# Patient Record
Sex: FEMALE | Race: OTHER | HISPANIC OR LATINO | URBAN - METROPOLITAN AREA
[De-identification: names, ages, dates, MRNs, and addresses within clinical notes are randomized per-mention and may not be internally consistent; named-entity substitution may affect disease eponyms.]

---

## 2018-02-18 ENCOUNTER — EMERGENCY (EMERGENCY)
Facility: HOSPITAL | Age: 83
LOS: 1 days | Discharge: HOME | End: 2018-02-18
Attending: EMERGENCY MEDICINE
Payer: COMMERCIAL

## 2018-02-18 VITALS
SYSTOLIC BLOOD PRESSURE: 146 MMHG | DIASTOLIC BLOOD PRESSURE: 72 MMHG | WEIGHT: 70.99 LBS | TEMPERATURE: 98 F | RESPIRATION RATE: 18 BRPM | HEART RATE: 84 BPM | HEIGHT: 56 IN | OXYGEN SATURATION: 98 %

## 2018-02-18 VITALS
DIASTOLIC BLOOD PRESSURE: 70 MMHG | OXYGEN SATURATION: 99 % | SYSTOLIC BLOOD PRESSURE: 155 MMHG | RESPIRATION RATE: 20 BRPM | HEART RATE: 84 BPM

## 2018-02-18 PROCEDURE — 73110 X-RAY EXAM OF WRIST: CPT

## 2018-02-18 PROCEDURE — 99284 EMERGENCY DEPT VISIT MOD MDM: CPT | Mod: 25

## 2018-02-18 PROCEDURE — 73130 X-RAY EXAM OF HAND: CPT

## 2018-02-18 PROCEDURE — 73110 X-RAY EXAM OF WRIST: CPT | Mod: 26,RT

## 2018-02-18 PROCEDURE — 73130 X-RAY EXAM OF HAND: CPT | Mod: 26,RT

## 2018-02-18 PROCEDURE — 99284 EMERGENCY DEPT VISIT MOD MDM: CPT

## 2018-02-18 RX ORDER — ACETAMINOPHEN 500 MG
325 TABLET ORAL EVERY 4 HOURS
Qty: 0 | Refills: 0 | Status: DISCONTINUED | OUTPATIENT
Start: 2018-02-18 | End: 2018-02-22

## 2018-02-18 RX ADMIN — Medication 325 MILLIGRAM(S): at 15:16

## 2018-02-18 NOTE — ED PROVIDER NOTE - OBJECTIVE STATEMENT
89 y/o F pt w/ PMHx of RA w/ existing deformities on both hands presents to the ED c/o R hand pain s/p mechanical trip and fall. Denies numbness/tingling, weakness, head injury, LOC or any other complaints. NKDA.

## 2018-02-18 NOTE — ED ADULT NURSE NOTE - OBJECTIVE STATEMENT
pt from home c/o of Rt hand pain s/p trip and fall yesterday pt is alert awake oriented to person place denies any head injury Rt hand swelling with bruising skin dry and intact

## 2018-02-18 NOTE — ED ADULT TRIAGE NOTE - CHIEF COMPLAINT QUOTE
C/O right hand pain and ecchymosis "Yesterday she fell down she was walking and saved herself with the right hand" per daughter

## 2019-11-01 ENCOUNTER — OUTPATIENT (OUTPATIENT)
Dept: OUTPATIENT SERVICES | Facility: HOSPITAL | Age: 84
LOS: 1 days | End: 2019-11-01
Payer: MEDICARE

## 2019-11-01 PROCEDURE — G9001: CPT

## 2019-11-17 ENCOUNTER — INPATIENT (INPATIENT)
Facility: HOSPITAL | Age: 84
LOS: 3 days | Discharge: HOME | DRG: 177 | End: 2019-11-21
Attending: INTERNAL MEDICINE | Admitting: INTERNAL MEDICINE
Payer: COMMERCIAL

## 2019-11-17 VITALS
OXYGEN SATURATION: 98 % | RESPIRATION RATE: 20 BRPM | WEIGHT: 76.94 LBS | SYSTOLIC BLOOD PRESSURE: 203 MMHG | HEART RATE: 78 BPM | HEIGHT: 56 IN | DIASTOLIC BLOOD PRESSURE: 67 MMHG

## 2019-11-17 DIAGNOSIS — I10 ESSENTIAL (PRIMARY) HYPERTENSION: ICD-10-CM

## 2019-11-17 DIAGNOSIS — J18.9 PNEUMONIA, UNSPECIFIED ORGANISM: ICD-10-CM

## 2019-11-17 DIAGNOSIS — F03.90 UNSPECIFIED DEMENTIA WITHOUT BEHAVIORAL DISTURBANCE: ICD-10-CM

## 2019-11-17 DIAGNOSIS — I21.4 NON-ST ELEVATION (NSTEMI) MYOCARDIAL INFARCTION: ICD-10-CM

## 2019-11-17 DIAGNOSIS — R94.5 ABNORMAL RESULTS OF LIVER FUNCTION STUDIES: ICD-10-CM

## 2019-11-17 DIAGNOSIS — Z29.9 ENCOUNTER FOR PROPHYLACTIC MEASURES, UNSPECIFIED: ICD-10-CM

## 2019-11-17 DIAGNOSIS — N17.9 ACUTE KIDNEY FAILURE, UNSPECIFIED: ICD-10-CM

## 2019-11-17 DIAGNOSIS — Z71.89 OTHER SPECIFIED COUNSELING: ICD-10-CM

## 2019-11-17 LAB
ALBUMIN SERPL ELPH-MCNC: 3.8 G/DL — SIGNIFICANT CHANGE UP (ref 3.5–5)
ALP SERPL-CCNC: 217 U/L — HIGH (ref 40–120)
ALT FLD-CCNC: 101 U/L DA — HIGH (ref 10–60)
ANION GAP SERPL CALC-SCNC: 12 MMOL/L — SIGNIFICANT CHANGE UP (ref 5–17)
APPEARANCE UR: CLEAR — SIGNIFICANT CHANGE UP
APTT BLD: 27.4 SEC — LOW (ref 27.5–36.3)
APTT BLD: 52.1 SEC — HIGH (ref 27.5–36.3)
AST SERPL-CCNC: 140 U/L — HIGH (ref 10–40)
BACTERIA # UR AUTO: ABNORMAL /HPF
BASOPHILS # BLD AUTO: 0.08 K/UL — SIGNIFICANT CHANGE UP (ref 0–0.2)
BASOPHILS NFR BLD AUTO: 1 % — SIGNIFICANT CHANGE UP (ref 0–2)
BILIRUB SERPL-MCNC: 2.2 MG/DL — HIGH (ref 0.2–1.2)
BILIRUB UR-MCNC: NEGATIVE — SIGNIFICANT CHANGE UP
BUN SERPL-MCNC: 52 MG/DL — HIGH (ref 7–18)
CALCIUM SERPL-MCNC: 8.9 MG/DL — SIGNIFICANT CHANGE UP (ref 8.4–10.5)
CHLORIDE SERPL-SCNC: 113 MMOL/L — HIGH (ref 96–108)
CK MB BLD-MCNC: 2.8 % — SIGNIFICANT CHANGE UP (ref 0–3.5)
CK MB CFR SERPL CALC: 8.5 NG/ML — HIGH (ref 0–3.6)
CK SERPL-CCNC: 306 U/L — HIGH (ref 21–215)
CO2 SERPL-SCNC: 21 MMOL/L — LOW (ref 22–31)
COLOR SPEC: YELLOW — SIGNIFICANT CHANGE UP
CREAT SERPL-MCNC: 1.84 MG/DL — HIGH (ref 0.5–1.3)
DIFF PNL FLD: ABNORMAL
EOSINOPHIL # BLD AUTO: 0 K/UL — SIGNIFICANT CHANGE UP (ref 0–0.5)
EOSINOPHIL NFR BLD AUTO: 0 % — SIGNIFICANT CHANGE UP (ref 0–6)
EPI CELLS # UR: SIGNIFICANT CHANGE UP /HPF
FLU A RESULT: SIGNIFICANT CHANGE UP
FLU A RESULT: SIGNIFICANT CHANGE UP
FLUAV AG NPH QL: SIGNIFICANT CHANGE UP
FLUBV AG NPH QL: SIGNIFICANT CHANGE UP
GLUCOSE SERPL-MCNC: 113 MG/DL — HIGH (ref 70–99)
GLUCOSE UR QL: NEGATIVE — SIGNIFICANT CHANGE UP
GRAN CASTS # UR COMP ASSIST: ABNORMAL /LPF
HCT VFR BLD CALC: 39.5 % — SIGNIFICANT CHANGE UP (ref 34.5–45)
HCT VFR BLD CALC: 42.3 % — SIGNIFICANT CHANGE UP (ref 34.5–45)
HGB BLD-MCNC: 12.4 G/DL — SIGNIFICANT CHANGE UP (ref 11.5–15.5)
HGB BLD-MCNC: 12.9 G/DL — SIGNIFICANT CHANGE UP (ref 11.5–15.5)
HYALINE CASTS # UR AUTO: ABNORMAL /LPF
INR BLD: 1.28 RATIO — HIGH (ref 0.88–1.16)
KETONES UR-MCNC: ABNORMAL
LACTATE SERPL-SCNC: 2.4 MMOL/L — HIGH (ref 0.7–2)
LACTATE SERPL-SCNC: 2.4 MMOL/L — HIGH (ref 0.7–2)
LACTATE SERPL-SCNC: 2.6 MMOL/L — HIGH (ref 0.7–2)
LEUKOCYTE ESTERASE UR-ACNC: ABNORMAL
LYMPHOCYTES # BLD AUTO: 0.57 K/UL — LOW (ref 1–3.3)
LYMPHOCYTES # BLD AUTO: 7 % — LOW (ref 13–44)
MANUAL SMEAR VERIFICATION: SIGNIFICANT CHANGE UP
MCHC RBC-ENTMCNC: 28.4 PG — SIGNIFICANT CHANGE UP (ref 27–34)
MCHC RBC-ENTMCNC: 28.8 PG — SIGNIFICANT CHANGE UP (ref 27–34)
MCHC RBC-ENTMCNC: 30.5 GM/DL — LOW (ref 32–36)
MCHC RBC-ENTMCNC: 31.4 GM/DL — LOW (ref 32–36)
MCV RBC AUTO: 91.6 FL — SIGNIFICANT CHANGE UP (ref 80–100)
MCV RBC AUTO: 93.2 FL — SIGNIFICANT CHANGE UP (ref 80–100)
MONOCYTES # BLD AUTO: 0.32 K/UL — SIGNIFICANT CHANGE UP (ref 0–0.9)
MONOCYTES NFR BLD AUTO: 4 % — SIGNIFICANT CHANGE UP (ref 2–14)
NEUTROPHILS # BLD AUTO: 7.14 K/UL — SIGNIFICANT CHANGE UP (ref 1.8–7.4)
NEUTROPHILS NFR BLD AUTO: 87 % — HIGH (ref 43–77)
NEUTS BAND # BLD: 1 % — SIGNIFICANT CHANGE UP (ref 0–8)
NEUTS HYPERSEG # BLD: PRESENT — SIGNIFICANT CHANGE UP
NITRITE UR-MCNC: NEGATIVE — SIGNIFICANT CHANGE UP
NRBC # BLD: 0 /100 WBCS — SIGNIFICANT CHANGE UP (ref 0–0)
NRBC # BLD: 0 /100 — SIGNIFICANT CHANGE UP (ref 0–0)
OVALOCYTES BLD QL SMEAR: SLIGHT — SIGNIFICANT CHANGE UP
PH UR: 5 — SIGNIFICANT CHANGE UP (ref 5–8)
PLAT MORPH BLD: NORMAL — SIGNIFICANT CHANGE UP
PLATELET # BLD AUTO: 172 K/UL — SIGNIFICANT CHANGE UP (ref 150–400)
PLATELET # BLD AUTO: 183 K/UL — SIGNIFICANT CHANGE UP (ref 150–400)
POTASSIUM SERPL-MCNC: 4 MMOL/L — SIGNIFICANT CHANGE UP (ref 3.5–5.3)
POTASSIUM SERPL-SCNC: 4 MMOL/L — SIGNIFICANT CHANGE UP (ref 3.5–5.3)
PROT SERPL-MCNC: 7.1 G/DL — SIGNIFICANT CHANGE UP (ref 6–8.3)
PROT UR-MCNC: 500 MG/DL
PROTHROM AB SERPL-ACNC: 14.3 SEC — HIGH (ref 10–12.9)
RBC # BLD: 4.31 M/UL — SIGNIFICANT CHANGE UP (ref 3.8–5.2)
RBC # BLD: 4.54 M/UL — SIGNIFICANT CHANGE UP (ref 3.8–5.2)
RBC # FLD: 14.8 % — HIGH (ref 10.3–14.5)
RBC # FLD: 14.9 % — HIGH (ref 10.3–14.5)
RBC BLD AUTO: ABNORMAL
RBC CASTS # UR COMP ASSIST: SIGNIFICANT CHANGE UP /HPF (ref 0–2)
RSV RESULT: SIGNIFICANT CHANGE UP
RSV RNA RESP QL NAA+PROBE: SIGNIFICANT CHANGE UP
SODIUM SERPL-SCNC: 146 MMOL/L — HIGH (ref 135–145)
SP GR SPEC: 1.02 — SIGNIFICANT CHANGE UP (ref 1.01–1.02)
TROPONIN I SERPL-MCNC: 3.78 NG/ML — HIGH (ref 0–0.04)
TROPONIN I SERPL-MCNC: 4.12 NG/ML — HIGH (ref 0–0.04)
UROBILINOGEN FLD QL: 4
WBC # BLD: 8.11 K/UL — SIGNIFICANT CHANGE UP (ref 3.8–10.5)
WBC # BLD: 8.65 K/UL — SIGNIFICANT CHANGE UP (ref 3.8–10.5)
WBC # FLD AUTO: 8.11 K/UL — SIGNIFICANT CHANGE UP (ref 3.8–10.5)
WBC # FLD AUTO: 8.65 K/UL — SIGNIFICANT CHANGE UP (ref 3.8–10.5)
WBC UR QL: SIGNIFICANT CHANGE UP /HPF (ref 0–5)

## 2019-11-17 PROCEDURE — 99291 CRITICAL CARE FIRST HOUR: CPT

## 2019-11-17 PROCEDURE — 71045 X-RAY EXAM CHEST 1 VIEW: CPT | Mod: 26

## 2019-11-17 RX ORDER — ALBUTEROL 90 UG/1
3 AEROSOL, METERED ORAL
Qty: 0 | Refills: 0 | DISCHARGE

## 2019-11-17 RX ORDER — ASPIRIN/CALCIUM CARB/MAGNESIUM 324 MG
324 TABLET ORAL ONCE
Refills: 0 | Status: COMPLETED | OUTPATIENT
Start: 2019-11-17 | End: 2019-11-17

## 2019-11-17 RX ORDER — CLOTRIMAZOLE 10 MG
1 TROCHE MUCOUS MEMBRANE
Qty: 0 | Refills: 0 | DISCHARGE

## 2019-11-17 RX ORDER — ASPIRIN/CALCIUM CARB/MAGNESIUM 324 MG
1 TABLET ORAL
Qty: 0 | Refills: 0 | DISCHARGE

## 2019-11-17 RX ORDER — LEVOTHYROXINE SODIUM 125 MCG
25 TABLET ORAL DAILY
Refills: 0 | Status: DISCONTINUED | OUTPATIENT
Start: 2019-11-17 | End: 2019-11-18

## 2019-11-17 RX ORDER — ASPIRIN/CALCIUM CARB/MAGNESIUM 324 MG
81 TABLET ORAL DAILY
Refills: 0 | Status: DISCONTINUED | OUTPATIENT
Start: 2019-11-17 | End: 2019-11-20

## 2019-11-17 RX ORDER — AZITHROMYCIN 500 MG/1
500 TABLET, FILM COATED ORAL ONCE
Refills: 0 | Status: COMPLETED | OUTPATIENT
Start: 2019-11-17 | End: 2019-11-17

## 2019-11-17 RX ORDER — INSULIN LISPRO 100/ML
VIAL (ML) SUBCUTANEOUS EVERY 6 HOURS
Refills: 0 | Status: DISCONTINUED | OUTPATIENT
Start: 2019-11-17 | End: 2019-11-19

## 2019-11-17 RX ORDER — SOD,AMMONIUM,POTASSIUM LACTATE
1 CREAM (GRAM) TOPICAL
Qty: 0 | Refills: 0 | DISCHARGE

## 2019-11-17 RX ORDER — PIPERACILLIN AND TAZOBACTAM 4; .5 G/20ML; G/20ML
3.38 INJECTION, POWDER, LYOPHILIZED, FOR SOLUTION INTRAVENOUS ONCE
Refills: 0 | Status: COMPLETED | OUTPATIENT
Start: 2019-11-17 | End: 2019-11-17

## 2019-11-17 RX ORDER — AMLODIPINE BESYLATE 2.5 MG/1
10 TABLET ORAL ONCE
Refills: 0 | Status: COMPLETED | OUTPATIENT
Start: 2019-11-17 | End: 2019-11-17

## 2019-11-17 RX ORDER — MULTIVIT-MIN/FERROUS GLUCONATE 9 MG/15 ML
1 LIQUID (ML) ORAL
Qty: 0 | Refills: 0 | DISCHARGE

## 2019-11-17 RX ORDER — PIPERACILLIN AND TAZOBACTAM 4; .5 G/20ML; G/20ML
3.38 INJECTION, POWDER, LYOPHILIZED, FOR SOLUTION INTRAVENOUS EVERY 12 HOURS
Refills: 0 | Status: DISCONTINUED | OUTPATIENT
Start: 2019-11-17 | End: 2019-11-19

## 2019-11-17 RX ORDER — DONEPEZIL HYDROCHLORIDE 10 MG/1
5 TABLET, FILM COATED ORAL AT BEDTIME
Refills: 0 | Status: DISCONTINUED | OUTPATIENT
Start: 2019-11-17 | End: 2019-11-21

## 2019-11-17 RX ORDER — CEFTRIAXONE 500 MG/1
1000 INJECTION, POWDER, FOR SOLUTION INTRAMUSCULAR; INTRAVENOUS ONCE
Refills: 0 | Status: COMPLETED | OUTPATIENT
Start: 2019-11-17 | End: 2019-11-17

## 2019-11-17 RX ORDER — SODIUM CHLORIDE 9 MG/ML
1000 INJECTION, SOLUTION INTRAVENOUS
Refills: 0 | Status: DISCONTINUED | OUTPATIENT
Start: 2019-11-17 | End: 2019-11-17

## 2019-11-17 RX ORDER — IPRATROPIUM/ALBUTEROL SULFATE 18-103MCG
3 AEROSOL WITH ADAPTER (GRAM) INHALATION EVERY 6 HOURS
Refills: 0 | Status: DISCONTINUED | OUTPATIENT
Start: 2019-11-17 | End: 2019-11-21

## 2019-11-17 RX ORDER — MULTIVIT-MIN/FERROUS GLUCONATE 9 MG/15 ML
1 LIQUID (ML) ORAL DAILY
Refills: 0 | Status: DISCONTINUED | OUTPATIENT
Start: 2019-11-17 | End: 2019-11-21

## 2019-11-17 RX ORDER — CHOLECALCIFEROL (VITAMIN D3) 125 MCG
1 CAPSULE ORAL
Qty: 0 | Refills: 0 | DISCHARGE

## 2019-11-17 RX ORDER — HEPARIN SODIUM 5000 [USP'U]/ML
2700 INJECTION INTRAVENOUS; SUBCUTANEOUS EVERY 6 HOURS
Refills: 0 | Status: DISCONTINUED | OUTPATIENT
Start: 2019-11-17 | End: 2019-11-18

## 2019-11-17 RX ORDER — CHOLECALCIFEROL (VITAMIN D3) 125 MCG
1000 CAPSULE ORAL DAILY
Refills: 0 | Status: DISCONTINUED | OUTPATIENT
Start: 2019-11-17 | End: 2019-11-21

## 2019-11-17 RX ORDER — HEPARIN SODIUM 5000 [USP'U]/ML
2700 INJECTION INTRAVENOUS; SUBCUTANEOUS ONCE
Refills: 0 | Status: COMPLETED | OUTPATIENT
Start: 2019-11-17 | End: 2019-11-17

## 2019-11-17 RX ORDER — DONEPEZIL HYDROCHLORIDE 10 MG/1
1 TABLET, FILM COATED ORAL
Qty: 0 | Refills: 0 | DISCHARGE

## 2019-11-17 RX ORDER — LEVOTHYROXINE SODIUM 125 MCG
1 TABLET ORAL
Qty: 0 | Refills: 0 | DISCHARGE

## 2019-11-17 RX ORDER — SODIUM CHLORIDE 9 MG/ML
1000 INJECTION, SOLUTION INTRAVENOUS ONCE
Refills: 0 | Status: COMPLETED | OUTPATIENT
Start: 2019-11-17 | End: 2019-11-17

## 2019-11-17 RX ORDER — AMLODIPINE BESYLATE 2.5 MG/1
10 TABLET ORAL DAILY
Refills: 0 | Status: DISCONTINUED | OUTPATIENT
Start: 2019-11-17 | End: 2019-11-21

## 2019-11-17 RX ORDER — HEPARIN SODIUM 5000 [USP'U]/ML
INJECTION INTRAVENOUS; SUBCUTANEOUS
Qty: 25000 | Refills: 0 | Status: DISCONTINUED | OUTPATIENT
Start: 2019-11-17 | End: 2019-11-18

## 2019-11-17 RX ADMIN — DONEPEZIL HYDROCHLORIDE 5 MILLIGRAM(S): 10 TABLET, FILM COATED ORAL at 21:17

## 2019-11-17 RX ADMIN — HEPARIN SODIUM 600 UNIT(S)/HR: 5000 INJECTION INTRAVENOUS; SUBCUTANEOUS at 21:15

## 2019-11-17 RX ADMIN — AZITHROMYCIN 255 MILLIGRAM(S): 500 TABLET, FILM COATED ORAL at 14:47

## 2019-11-17 RX ADMIN — HEPARIN SODIUM 2700 UNIT(S): 5000 INJECTION INTRAVENOUS; SUBCUTANEOUS at 14:50

## 2019-11-17 RX ADMIN — CEFTRIAXONE 100 MILLIGRAM(S): 500 INJECTION, POWDER, FOR SOLUTION INTRAMUSCULAR; INTRAVENOUS at 14:47

## 2019-11-17 RX ADMIN — HEPARIN SODIUM 500 UNIT(S)/HR: 5000 INJECTION INTRAVENOUS; SUBCUTANEOUS at 15:01

## 2019-11-17 RX ADMIN — SODIUM CHLORIDE 2000 MILLILITER(S): 9 INJECTION, SOLUTION INTRAVENOUS at 20:02

## 2019-11-17 RX ADMIN — PIPERACILLIN AND TAZOBACTAM 200 GRAM(S): 4; .5 INJECTION, POWDER, LYOPHILIZED, FOR SOLUTION INTRAVENOUS at 17:47

## 2019-11-17 NOTE — ED PROVIDER NOTE - CARE PLAN
Principal Discharge DX:	NSTEMI (non-ST elevated myocardial infarction)  Secondary Diagnosis:	Pneumonia  Secondary Diagnosis:	DAMARI (acute kidney injury)  Secondary Diagnosis:	Hypernatremia

## 2019-11-17 NOTE — H&P ADULT - ASSESSMENT
Patient is a 89 year old female, with PMH of dementia, HTN and HTN, BIBEMS from home for shortness of breath and cough.     CXR showing RLL PNA  Troponins elevated at 4.120 Patient is a 89 year old female, with PMH of dementia, HTN and HTN, BIBEMS from home for shortness of breath and cough.     CXR showing RLL PNA  Troponins elevated at 4.120  Given dose of azithromycin, rocephin and zosyn in ED   Started on heparin drip in ED    Admitted for RLL PNA, NSTEMI, and DAMARI Patient is a 89 year old female, with PMH of dementia, HTN and HTN, BIBEMS from home for shortness of breath and cough.     CXR showing RLL PNA  Troponins elevated at 4.120  Lactate of 2.6 on admission, repeat of 2.4  Given dose of azithromycin, rocephin and zosyn in ED   Started on heparin drip in ED    Admitted for RLL PNA, NSTEMI, and DAMARI

## 2019-11-17 NOTE — H&P ADULT - PROBLEM SELECTOR PLAN 1
-presented with SOB and cough  -as per daughter, pt has chronic cough which got worse this morning with clear sputum  -CXR showing RLL PNA  -afebrile and no leukocytosis  -concern for aspiration PNA  -given dose of zithro, rocephin, and zosyn  -will continue zosyn   -f/u speech and swallow  -NPO for now  -IVF  -follow lactate  -f/u urine and blood cx -presented with SOB and cough  -as per daughter, pt has chronic cough which got worse this morning with clear sputum  -CXR showing RLL PNA  -afebrile and no leukocytosis  -concern for aspiration PNA  -given dose of zithro, rocephin, and zosyn  -will continue zosyn   -f/u speech and swallow  -NPO for now  -IVF  -follow lactate  -f/u urine and blood cx  follow urine legionella and RVP

## 2019-11-17 NOTE — ED PROVIDER NOTE - NS ED ROS FT
Constitutional: No fever or chills. + FATIGUE  Eyes: No pain, blurry vision, or discharge.  ENMT: No hearing changes, pain, discharge or infections. No neck pain or stiffness.  Cardiac: No chest pain, SOB or edema.    Respiratory: + SOB + COUGH    GI: No nausea, vomiting, diarrhea or abdominal pain.  : No dysuria, frequency or burning.  MS: No myalgia, muscle weakness, joint pain or back pain.  Neuro: No headache or weakness. No LOC.  Skin: No skin rash.   Endocrine: No history of thyroid disease or diabetes.  Except as documented in the HPI, all other systems are negative. Constitutional: No fever or chills. + FATIGUE  Eyes: No pain, blurry vision, or discharge.  ENMT: No hearing changes, pain, discharge or infections. No neck pain or stiffness.  Cardiac: No chest pain, SOB or edema.    Respiratory: + SOB + COUGH    GI: No nausea, vomiting, diarrhea or abdominal pain.  : No dysuria, frequency or burning.  MS: No myalgia, muscle weakness, joint pain or back pain.  Neuro: No headache or weakness. No LOC.  Skin: No skin rash.

## 2019-11-17 NOTE — H&P ADULT - PROBLEM SELECTOR PLAN 6
-history of HTN  -continue home meds of amlodipine  -monitor BP and adjust as needed -history of HTN  -continue home meds of amlodipine 10 mg daily   -monitor BP and adjust as needed

## 2019-11-17 NOTE — ED PROVIDER NOTE - ATTENDING CONTRIBUTION TO CARE
Attending Attestation:  Theodore Lima MD,  I have personally performed a history and physical examination of the patient and discussed management with the Student as well as the patient.  I reviewed the Student's note and agree with the documented findings and plan of care.  I have authored and modified critical sections of the Provider Note, including but not limited to HPI, Physical Exam and MDM.

## 2019-11-17 NOTE — ED PROVIDER NOTE - PROGRESS NOTE DETAILS
mitchell: Discussed need to admit with patient & discussed risk and benefits.  Patient agreed to admission.  Discussed case w/ admitting doctor - agreed to admit to their service. discussed  case with mar

## 2019-11-17 NOTE — H&P ADULT - HISTORY OF PRESENT ILLNESS
Patient is a 89 year old female, with PMH of dementia, HTN and HTN, BIBEMS from home for shortness of breath and cough. Patient is a 89 year old female, with PMH of dementia AAO x1 baseline, hypothyroidism, HTN, HLD and has HHA 9 hours Mon-Sat, BIBEMS from home for shortness of breath and cough. As per daughter at bedside, patient has had a chronic cough for some time. This morning, patient seemed to have worsening cough productive of clear sputum. As per daughter, patient also seems to have been coughing whenever she eats food. She normally eats soft foods at home. As per daughter, patient does not seem to have any pain, nausea or vomiting. Unable to get full ROS due to patient's dementia.

## 2019-11-17 NOTE — H&P ADULT - PROBLEM SELECTOR PLAN 7
-as per daughter, pt is DNR/DNI  -daughter is HCP  -does not want any aggressive interventions  -MOLST form in chart

## 2019-11-17 NOTE — H&P ADULT - ATTENDING COMMENTS
aspiration pna - npo, iv abx  NSTEMI - no aggressive intervention. hep gtt, echo, chards f/u  consider palliative eval

## 2019-11-17 NOTE — ED PROVIDER NOTE - OBJECTIVE STATEMENT
Pertinent HPI/PMH/PSH/FHx/SHx and Review of Systems contained within  HPI: 89 yo F bib EMS from home due to SOB. Pt also having productive cough x 2-3 days and generalized fatigue. Pt is demented, but at baseline per daughter, and is unable to provide good Hx. Daughter is Health Care Proxy. Daughter state pt is DNR/DNI and pt does not want stents, Angiography or Bypass Surgery due to pt's age.   PMH/PSH relevant for: HTN, Dementia, HLD  As per daughters ROS negative for: fever, Chest pain, Nausea, vomiting, diarrhea, abdominal pain, dysuria    FamilyHx and SocialHx not otherwise contributory Pertinent HPI/PMH/PSH/FHx/SHx and Review of Systems contained within  HPI: 89 yo F bib EMS from home due to SOB. Pt also having productive cough x 2-3 days and generalized fatigue. Pt is demented, but at baseline per daughter, and is unable to provide good Hx.   of note Daughter is Health Care Proxy. Daughter states pt is DNR/DNI and pt does not want stents, Angiography or Bypass Surgery or any invasive procedures due to pt's age.   PMH/PSH relevant for: HTN, Dementia, HLD  As per daughters ROS negative for: fever, Chest pain, Nausea, vomiting, diarrhea,   FamilyHx and SocialHx not otherwise contributory

## 2019-11-17 NOTE — ED ADULT NURSE NOTE - OBJECTIVE STATEMENT
BIB EMS from home for generalized weakness AMS poor appetite, upon arrival Pt lethargic arose by verbal stimuli on NRM family @bed side. safety maintained

## 2019-11-17 NOTE — ED ADULT NURSE NOTE - ED STAT RN HANDOFF DETAILS
Pt stable at this time, in no distress. Admitted to [facility], on Box B, awaiting bed, endorsed to AILEEN Hill

## 2019-11-17 NOTE — ED PROVIDER NOTE - PHYSICAL EXAMINATION
*GEN: NAD; + CHRONIC ILL APPEARING  *HEAD: NC/AT   *EYES/NOSE: PERRL & EOMI b/l  *THROAT: airway patent, moist mucous membranes  *NECK: Neck supple, no masses  *PULMONARY: + BIBASALAR RALES  *CARDIAC: s1s2, regular rhythm, no Murmur  *ABDOMEN:  ND, NT, soft, no guarding, no rebound, no masses   *BACK: no CVA tenderness, Normal  spine   *EXTREMITIES: symmetric pulses, 2+ dp & radial pulses, capillary refill < 2 seconds, no cyanosis, no edema   *SKIN: no rash or bruising   *NEUROLOGIC: alert, CN 2-12 intact, moves all 4 extremities, full active & passive ROM in all extremities, normal baseline gait *GEN: NAD; + CHRONICILLY ILL APPEARING  *HEAD: NC/AT   *EYES/NOSE: PERRL & EOMI b/l  *THROAT: airway patent, moist mucous membranes  *NECK: Neck supple, no masses  *PULMONARY: + BIBASALAR RALES  *CARDIAC: s1s2, regular rhythm, no Murmur  *ABDOMEN:  ND, NT, soft, no guarding, no rebound, no masses   *BACK: no CVA tenderness, Normal  spine   *EXTREMITIES: symmetric pulses, 2+ dp & radial pulses, capillary refill < 2 seconds, no cyanosis, no edema   *SKIN: no rash or bruising   *NEUROLOGIC: alert, CN 2-12 intact, moves all 4 extremities, full active & passive ROM in all extremities,

## 2019-11-17 NOTE — ED PROVIDER NOTE - CRITICAL CARE PROVIDED
additional history taking/consultation with other physicians/conducted a detailed discussion of DNR status/direct patient care (not related to procedure)/documentation/interpretation of diagnostic studies/consult w/ pt's family directly relating to pts condition

## 2019-11-17 NOTE — ED PROVIDER NOTE - CLINICAL SUMMARY MEDICAL DECISION MAKING FREE TEXT BOX
Pt has a PNA and NSTEMI. Daughter is Health Care Proxy. Daughter state pt it is DNR/DNI and pt does not want stents, Angiography or Bypass Surgery due to pt's age. Daughter agrees to medications. Will admit pt.

## 2019-11-17 NOTE — H&P ADULT - PROBLEM SELECTOR PLAN 8
IMPROVE VTE Individual Risk Assessment  RISK                                                                Points  [  ] Previous VTE                                                  3  [  ] Thrombophilia                                               2  [  ] Lower limb paralysis                                      2        (unable to hold up >15 seconds)    [  ] Current Cancer                                              2         (within 6 months)  [  ] Immobilization > 24 hrs                                1  [  ] ICU/CCU stay > 24 hours                              1  (X) Age > 60                                                      1  IMPROVE VTE Score 1    currently on heparin drip

## 2019-11-17 NOTE — ED ADULT NURSE REASSESSMENT NOTE - NS ED NURSE REASSESS COMMENT FT1
Received pt from AILEEN Banuelos, pt is observed laying in bed, breathing via NC @ 2l/min, tolerating well, in no respiratory distress at time of assessment. Pt is A&O x1-2, not very verbal, murmurs words, not able to make needs known. Skin intact, pt is not ambulatory. Left AC #18Ga and right AC #20Ga in place. Meds administered as ordered. Repeat cardiac enzymes and RVP specimens sent to lab. Pt receiving heparin @ 500units/min, tolerating well. Admitted to [facility], on box B, sinus rhythm noted, HR 63 at this time. Awaiting bed, endorsed to AILEEN Hill for holding. Daughter at bedside.

## 2019-11-17 NOTE — H&P ADULT - PROBLEM SELECTOR PLAN 2
-noted to have elevated troponin of 4.12  -started on heparin drip  -EKG showing RBBB  -cardio Dr. Singh consulted  -ECHO ordered  -tele monitoring  -troponin downtrending and now is 3.780  -f/u cardio for need for heparin drip

## 2019-11-17 NOTE — H&P ADULT - NSHPSOCIALHISTORY_GEN_ALL_CORE
Patient lives at home with daughter. Has HHA from 9am-6pm Monday to Saturday. No history of smoking, alcohol or illicit drug use. Patient uses walker at baseline and uses wheelchair as per daughter.

## 2019-11-18 DIAGNOSIS — Z51.5 ENCOUNTER FOR PALLIATIVE CARE: ICD-10-CM

## 2019-11-18 DIAGNOSIS — R53.2 FUNCTIONAL QUADRIPLEGIA: ICD-10-CM

## 2019-11-18 DIAGNOSIS — E03.9 HYPOTHYROIDISM, UNSPECIFIED: ICD-10-CM

## 2019-11-18 DIAGNOSIS — E43 UNSPECIFIED SEVERE PROTEIN-CALORIE MALNUTRITION: ICD-10-CM

## 2019-11-18 DIAGNOSIS — E87.6 HYPOKALEMIA: ICD-10-CM

## 2019-11-18 DIAGNOSIS — J69.0 PNEUMONITIS DUE TO INHALATION OF FOOD AND VOMIT: ICD-10-CM

## 2019-11-18 DIAGNOSIS — Z02.9 ENCOUNTER FOR ADMINISTRATIVE EXAMINATIONS, UNSPECIFIED: ICD-10-CM

## 2019-11-18 LAB
ALBUMIN SERPL ELPH-MCNC: 3.4 G/DL — LOW (ref 3.5–5)
ALP SERPL-CCNC: 208 U/L — HIGH (ref 40–120)
ALT FLD-CCNC: 86 U/L DA — HIGH (ref 10–60)
ANION GAP SERPL CALC-SCNC: 11 MMOL/L — SIGNIFICANT CHANGE UP (ref 5–17)
APTT BLD: 28.8 SEC — SIGNIFICANT CHANGE UP (ref 27.5–36.3)
APTT BLD: 66.6 SEC — HIGH (ref 27.5–36.3)
APTT BLD: 90.4 SEC — HIGH (ref 27.5–36.3)
AST SERPL-CCNC: 101 U/L — HIGH (ref 10–40)
BASOPHILS # BLD AUTO: 0.02 K/UL — SIGNIFICANT CHANGE UP (ref 0–0.2)
BASOPHILS NFR BLD AUTO: 0.3 % — SIGNIFICANT CHANGE UP (ref 0–2)
BILIRUB SERPL-MCNC: 1.7 MG/DL — HIGH (ref 0.2–1.2)
BUN SERPL-MCNC: 49 MG/DL — HIGH (ref 7–18)
CALCIUM SERPL-MCNC: 8.6 MG/DL — SIGNIFICANT CHANGE UP (ref 8.4–10.5)
CHLORIDE SERPL-SCNC: 113 MMOL/L — HIGH (ref 96–108)
CHOLEST SERPL-MCNC: 175 MG/DL — SIGNIFICANT CHANGE UP (ref 10–199)
CK MB BLD-MCNC: 3 % — SIGNIFICANT CHANGE UP (ref 0–3.5)
CK MB CFR SERPL CALC: 8.2 NG/ML — HIGH (ref 0–3.6)
CK SERPL-CCNC: 273 U/L — HIGH (ref 21–215)
CO2 SERPL-SCNC: 21 MMOL/L — LOW (ref 22–31)
CREAT SERPL-MCNC: 1.65 MG/DL — HIGH (ref 0.5–1.3)
CULTURE RESULTS: SIGNIFICANT CHANGE UP
EOSINOPHIL # BLD AUTO: 0.08 K/UL — SIGNIFICANT CHANGE UP (ref 0–0.5)
EOSINOPHIL NFR BLD AUTO: 1 % — SIGNIFICANT CHANGE UP (ref 0–6)
FOLATE SERPL-MCNC: 11.2 NG/ML — SIGNIFICANT CHANGE UP
GLUCOSE BLDC GLUCOMTR-MCNC: 109 MG/DL — HIGH (ref 70–99)
GLUCOSE BLDC GLUCOMTR-MCNC: 69 MG/DL — LOW (ref 70–99)
GLUCOSE BLDC GLUCOMTR-MCNC: 71 MG/DL — SIGNIFICANT CHANGE UP (ref 70–99)
GLUCOSE BLDC GLUCOMTR-MCNC: 89 MG/DL — SIGNIFICANT CHANGE UP (ref 70–99)
GLUCOSE BLDC GLUCOMTR-MCNC: 98 MG/DL — SIGNIFICANT CHANGE UP (ref 70–99)
GLUCOSE BLDC GLUCOMTR-MCNC: 99 MG/DL — SIGNIFICANT CHANGE UP (ref 70–99)
GLUCOSE SERPL-MCNC: 95 MG/DL — SIGNIFICANT CHANGE UP (ref 70–99)
HBA1C BLD-MCNC: 5.9 % — HIGH (ref 4–5.6)
HCT VFR BLD CALC: 38.3 % — SIGNIFICANT CHANGE UP (ref 34.5–45)
HDLC SERPL-MCNC: 76 MG/DL — SIGNIFICANT CHANGE UP
HGB BLD-MCNC: 11.9 G/DL — SIGNIFICANT CHANGE UP (ref 11.5–15.5)
IMM GRANULOCYTES NFR BLD AUTO: 0.3 % — SIGNIFICANT CHANGE UP (ref 0–1.5)
LACTATE SERPL-SCNC: 1.8 MMOL/L — SIGNIFICANT CHANGE UP (ref 0.7–2)
LIPID PNL WITH DIRECT LDL SERPL: 82 MG/DL — SIGNIFICANT CHANGE UP
LYMPHOCYTES # BLD AUTO: 0.49 K/UL — LOW (ref 1–3.3)
LYMPHOCYTES # BLD AUTO: 6.2 % — LOW (ref 13–44)
MAGNESIUM SERPL-MCNC: 2.2 MG/DL — SIGNIFICANT CHANGE UP (ref 1.6–2.6)
MCHC RBC-ENTMCNC: 28.4 PG — SIGNIFICANT CHANGE UP (ref 27–34)
MCHC RBC-ENTMCNC: 31.1 GM/DL — LOW (ref 32–36)
MCV RBC AUTO: 91.4 FL — SIGNIFICANT CHANGE UP (ref 80–100)
MONOCYTES # BLD AUTO: 0.49 K/UL — SIGNIFICANT CHANGE UP (ref 0–0.9)
MONOCYTES NFR BLD AUTO: 6.2 % — SIGNIFICANT CHANGE UP (ref 2–14)
NEUTROPHILS # BLD AUTO: 6.85 K/UL — SIGNIFICANT CHANGE UP (ref 1.8–7.4)
NEUTROPHILS NFR BLD AUTO: 86 % — HIGH (ref 43–77)
NRBC # BLD: 0 /100 WBCS — SIGNIFICANT CHANGE UP (ref 0–0)
PHOSPHATE SERPL-MCNC: 3.9 MG/DL — SIGNIFICANT CHANGE UP (ref 2.5–4.5)
PLATELET # BLD AUTO: 182 K/UL — SIGNIFICANT CHANGE UP (ref 150–400)
POTASSIUM SERPL-MCNC: 3.3 MMOL/L — LOW (ref 3.5–5.3)
POTASSIUM SERPL-SCNC: 3.3 MMOL/L — LOW (ref 3.5–5.3)
PROT SERPL-MCNC: 6.4 G/DL — SIGNIFICANT CHANGE UP (ref 6–8.3)
RBC # BLD: 4.19 M/UL — SIGNIFICANT CHANGE UP (ref 3.8–5.2)
RBC # FLD: 14.9 % — HIGH (ref 10.3–14.5)
SODIUM SERPL-SCNC: 145 MMOL/L — SIGNIFICANT CHANGE UP (ref 135–145)
SPECIMEN SOURCE: SIGNIFICANT CHANGE UP
TOTAL CHOLESTEROL/HDL RATIO MEASUREMENT: 2.3 RATIO — LOW (ref 3.3–7.1)
TRIGL SERPL-MCNC: 83 MG/DL — SIGNIFICANT CHANGE UP (ref 10–149)
TROPONIN I SERPL-MCNC: 3.29 NG/ML — HIGH (ref 0–0.04)
TSH SERPL-MCNC: 16.4 UU/ML — HIGH (ref 0.34–4.82)
VIT B12 SERPL-MCNC: 765 PG/ML — SIGNIFICANT CHANGE UP (ref 232–1245)
WBC # BLD: 7.95 K/UL — SIGNIFICANT CHANGE UP (ref 3.8–10.5)
WBC # FLD AUTO: 7.95 K/UL — SIGNIFICANT CHANGE UP (ref 3.8–10.5)

## 2019-11-18 PROCEDURE — 99497 ADVNCD CARE PLAN 30 MIN: CPT | Mod: 25

## 2019-11-18 PROCEDURE — 99223 1ST HOSP IP/OBS HIGH 75: CPT

## 2019-11-18 RX ORDER — METOPROLOL TARTRATE 50 MG
12.5 TABLET ORAL
Refills: 0 | Status: DISCONTINUED | OUTPATIENT
Start: 2019-11-18 | End: 2019-11-21

## 2019-11-18 RX ORDER — ATORVASTATIN CALCIUM 80 MG/1
10 TABLET, FILM COATED ORAL AT BEDTIME
Refills: 0 | Status: DISCONTINUED | OUTPATIENT
Start: 2019-11-18 | End: 2019-11-21

## 2019-11-18 RX ORDER — SODIUM CHLORIDE 9 MG/ML
1000 INJECTION, SOLUTION INTRAVENOUS
Refills: 0 | Status: DISCONTINUED | OUTPATIENT
Start: 2019-11-18 | End: 2019-11-20

## 2019-11-18 RX ORDER — LEVOTHYROXINE SODIUM 125 MCG
12.5 TABLET ORAL AT BEDTIME
Refills: 0 | Status: DISCONTINUED | OUTPATIENT
Start: 2019-11-19 | End: 2019-11-20

## 2019-11-18 RX ADMIN — Medication 3 MILLILITER(S): at 22:37

## 2019-11-18 RX ADMIN — HEPARIN SODIUM 500 UNIT(S)/HR: 5000 INJECTION INTRAVENOUS; SUBCUTANEOUS at 04:16

## 2019-11-18 RX ADMIN — Medication 1000 UNIT(S): at 12:57

## 2019-11-18 RX ADMIN — SODIUM CHLORIDE 50 MILLILITER(S): 9 INJECTION, SOLUTION INTRAVENOUS at 17:53

## 2019-11-18 RX ADMIN — HEPARIN SODIUM 0 UNIT(S)/HR: 5000 INJECTION INTRAVENOUS; SUBCUTANEOUS at 03:42

## 2019-11-18 RX ADMIN — Medication 3 MILLILITER(S): at 04:14

## 2019-11-18 RX ADMIN — Medication 3 MILLILITER(S): at 11:33

## 2019-11-18 RX ADMIN — Medication 3 MILLILITER(S): at 17:50

## 2019-11-18 RX ADMIN — Medication 1 TABLET(S): at 12:57

## 2019-11-18 RX ADMIN — Medication 81 MILLIGRAM(S): at 12:57

## 2019-11-18 RX ADMIN — PIPERACILLIN AND TAZOBACTAM 25 GRAM(S): 4; .5 INJECTION, POWDER, LYOPHILIZED, FOR SOLUTION INTRAVENOUS at 17:49

## 2019-11-18 RX ADMIN — AMLODIPINE BESYLATE 10 MILLIGRAM(S): 2.5 TABLET ORAL at 06:32

## 2019-11-18 RX ADMIN — Medication 25 MICROGRAM(S): at 06:32

## 2019-11-18 RX ADMIN — PIPERACILLIN AND TAZOBACTAM 25 GRAM(S): 4; .5 INJECTION, POWDER, LYOPHILIZED, FOR SOLUTION INTRAVENOUS at 06:32

## 2019-11-18 RX ADMIN — HEPARIN SODIUM 500 UNIT(S)/HR: 5000 INJECTION INTRAVENOUS; SUBCUTANEOUS at 11:33

## 2019-11-18 NOTE — SWALLOW BEDSIDE ASSESSMENT ADULT - COMMENTS
Pt seen at bedside with HOB elevated to 45 degrees. Daughter present at bedside. Pt AAOx1 (name only). Pt resistant to exam, and exhibited discomfort during palpation. SLP provided services in Hebrew. Pt received trials of puree, nectar, and thin. Wet and gurgly voice noted following trials. Pt had significant coughing episode and noted to have significant difficulty breathing and wet/gurgly voice.

## 2019-11-18 NOTE — PROGRESS NOTE ADULT - ATTENDING COMMENTS
pt seen and evaluated  asp pna, nstemi  dnr/i  comfort care  dysphagia diet, aware of asp risk  palliative f/u  plan for inpt hospice

## 2019-11-18 NOTE — CONSULT NOTE ADULT - SUBJECTIVE AND OBJECTIVE BOX
HPI:  Patient is a 89 year old female, with PMH of dementia AAO x1 baseline, hypothyroidism, HTN, HLD and has HHA 9 hours Mon-Sat, BIBEMS from home for shortness of breath and cough. As per daughter at bedside, patient has had a chronic cough for some time. This morning, patient seemed to have worsening cough productive of clear sputum. As per daughter, patient also seems to have been coughing whenever she eats food. She normally eats soft foods at home. As per daughter, patient does not seem to have any pain, nausea or vomiting. Unable to get full ROS due to patient's dementia. (2019 17:25)    Interval history: patient seen and examined in ED. Daughter at bedside. DNR/DNI on file .      PAST MEDICAL & SURGICAL HISTORY:  HLD (hyperlipidemia)  Dementia  Hypertension  No significant past surgical history      SOCIAL HISTORY:  has 4 children  Admitted from:  home     Preferred Language: Irish    Surrogate/HCP/Guardian:    Amber Lyon        Phone#: [phone]    FAMILY HISTORY: noncontributory    Baseline ADLs (prior to admission): min ambulatory, min verbal    Allergies    No Known Allergies    Intolerances      Present Symptoms:             Review of Systems:  Unable to obtain due to poor mentation     MEDICATIONS  (STANDING):  albuterol/ipratropium for Nebulization 3 milliLiter(s) Nebulizer every 6 hours  amLODIPine   Tablet 10 milliGRAM(s) Oral daily  aspirin enteric coated 81 milliGRAM(s) Oral daily  cholecalciferol 1000 Unit(s) Oral daily  donepezil 5 milliGRAM(s) Oral at bedtime  insulin lispro (HumaLOG) corrective regimen sliding scale   SubCutaneous every 6 hours  multivitamin/minerals 1 Tablet(s) Oral daily  piperacillin/tazobactam IVPB.. 3.375 Gram(s) IV Intermittent every 12 hours    MEDICATIONS  (PRN):      PHYSICAL EXAM:    Vital Signs Last 24 Hrs  T(C): 36.4 (2019 11:39), Max: 37.3 (2019 05:29)  T(F): 97.5 (2019 11:39), Max: 99.2 (2019 05:29)  HR: 74 (2019 11:39) (61 - 108)  BP: 152/71 (2019 11:39) (147/89 - 185/75)  BP(mean): --  RR: 20 (2019 11:39) (18 - 20)  SpO2: 94% (2019 11:39) (94% - 100%)     Karnofsky Performance Score/Palliative Performance Status Version2:  30  %     GENERAL: alert, cachectic,  NAD  HEENT: Atraumatic, oropharynx clear, neck supple  CHEST/LUNG: unlabored  HEART: Regular rate and rhythm    ABDOMEN: Soft, Nontender, Nondistended   MUSCULOSKELETAL:  No  edema   NERVOUS SYSTEM:   confused, min verbal  SKIN: No rashes or lesions noted  Oral intake: poor       LABS:                        11.9   7.95  )-----------( 182      ( 2019 06:28 )             38.3         145  |  113<H>  |  49<H>  ----------------------------<  95  3.3<L>   |  21<L>  |  1.65<H>    Ca    8.6      2019 06:28  Phos  3.9       Mg     2.2         TPro  6.4  /  Alb  3.4<L>  /  TBili  1.7<H>  /  DBili  x   /  AST  101<H>  /  ALT  86<H>  /  AlkPhos  208<H>      Urinalysis Basic - ( 2019 13:39 )    Color: Yellow / Appearance: Clear / S.025 / pH: x  Gluc: x / Ketone: Trace  / Bili: Negative / Urobili: 4   Blood: x / Protein: 500 mg/dL / Nitrite: Negative   Leuk Esterase: Trace / RBC: 0-2 /HPF / WBC 0-2 /HPF   Sq Epi: x / Non Sq Epi: Few /HPF / Bacteria: Few /HPF        RADIOLOGY & ADDITIONAL STUDIES:    ADVANCE DIRECTIVES: HCP

## 2019-11-18 NOTE — SWALLOW BEDSIDE ASSESSMENT ADULT - SLP PERTINENT HISTORY OF CURRENT PROBLEM
89 y/o F, with PMHx of dementia AAO x1 baseline, hypothyroidism, HTN, HLD and has HHA 9 hours Mon-Sat, BIBEMS from home for shortness of breath and cough. As per daughter at bedside, patient has had a chronic cough for some time. This morning, patient seemed to have worsening cough productive of clear sputum. As per daughter, patient also seems to have been coughing whenever she eats food. She normally eats soft foods at home. As per daughter, patient does not seem to have any pain, nausea or vomiting. Unable to get full ROS due to patient's dementia.

## 2019-11-18 NOTE — SWALLOW BEDSIDE ASSESSMENT ADULT - PHARYNGEAL PHASE
Delayed throat clear post oral intake/Delayed pharyngeal swallow/Decreased laryngeal elevation/Throat clear post oral intake/Wet vocal quality post oral intake/Multiple swallows Delayed throat clear post oral intake/Multiple swallows/Delayed pharyngeal swallow/Decreased laryngeal elevation/Throat clear post oral intake Delayed cough post oral intake/Delayed pharyngeal swallow/Multiple swallows/Decreased laryngeal elevation/Cough post oral intake

## 2019-11-18 NOTE — PROGRESS NOTE ADULT - PROBLEM SELECTOR PLAN 1
Tropinin downtrending  Heparin drip discontinued in setting of pt DNR/DNI, hospice per HCP   c/w ASA  F/u cardiology consult with Dr. Singh Tropinin downtrending  Heparin drip discontinued in setting of pt DNR/DNI, hospice per HCP   c/w ASA,   Will Statin, BB  Cardiology Dr. Singh following

## 2019-11-18 NOTE — CONSULT NOTE ADULT - PROBLEM SELECTOR RECOMMENDATION 9
advanced, min ambulatory, min verbal, declining over past several mo per daughter. FAST 7c, hospice eligible. Daughter reports patient awakes freq at night and tries to get out of bed. Currently calm

## 2019-11-18 NOTE — CONSULT NOTE ADULT - PROBLEM SELECTOR RECOMMENDATION 3
2/2 advanced dementia. Daughter reports patient often chokes with food, water intake. SLP eval. Does not want feeding tubes. Comfort feeds as tolerated. nutrition eval.

## 2019-11-18 NOTE — SWALLOW BEDSIDE ASSESSMENT ADULT - SWALLOW EVAL: DIAGNOSIS
Pt p/w oropharyngeal dysphagia c/b anterior loss of bolus, minimal bolus formation, suspected premature bolus escape, limited laryngeal elevation, delayed swallow trigger, and multiple swallows. Overt s/s of aspiration observed on all trials; throat clearing on puree and nectar and extensive coughing on thin liquids.

## 2019-11-18 NOTE — SWALLOW BEDSIDE ASSESSMENT ADULT - ORAL PHASE
Delayed oral transit time/Decreased anterior-posterior movement of the bolus Decreased anterior-posterior movement of the bolus/Delayed oral transit time Decreased anterior-posterior movement of the bolus/Delayed oral transit time/Stasis in anterior sulcus

## 2019-11-18 NOTE — CONSULT NOTE ADULT - PROBLEM SELECTOR RECOMMENDATION 6
Met with patient's daughter/HCP Amber at bedside regarding current condition, trajectory of advanced illness, goals of care. She stated she will bring in HCP form. She expressed that the patient has been declining over the past several months. Their main goal is for the patient to be comfortable, they do not want any aggressive measures. Hospice philosophy discussed, she is agreeable for hospice services at home. MATTHEW drafted for DNR/DNI, DNH, no feeding tubes per daughter's wishes. SUpport provided.     Total advance care planning discussion time: 25 min Met with patient's daughter/HCP Amber at bedside regarding current condition, trajectory of advanced illness, goals of care. She stated she will bring in HCP form. She expressed that the patient has been declining over the past several months. Their main goal is for the patient to be comfortable, they do not want any aggressive measures. Hospice philosophy discussed, she is agreeable for hospice services at home once medically stable. MATTHEW drafted for DNR/DNI, DNH, no feeding tubes per daughter's wishes. SUpport provided.     Total advance care planning discussion time: 25 min

## 2019-11-18 NOTE — SWALLOW BEDSIDE ASSESSMENT ADULT - ORAL PREPARATORY PHASE
Refuses to accept bolus into oral cavity/Reduced oral grading/Bolus falls into anterior sulcus/Anterior loss of bolus/Decreased mastication ability Anterior loss of bolus/Bolus falls into anterior sulcus Bolus falls into anterior sulcus/Anterior loss of bolus

## 2019-11-18 NOTE — CONSULT NOTE ADULT - SUBJECTIVE AND OBJECTIVE BOX
CHIEF COMPLAINT:Patient is a 89y old  Female who presents with a chief complaint of shortness of breath and cough.      HPI:  Patient is a 89 year old female, with PMH of dementia AAO x1 baseline, hypothyroidism, HTN, HLD and has HHA 9 hours Mon-Sat, BIBEMS from home for shortness of breath and cough. As per daughter at bedside, patient has had a chronic cough for some time. This morning, patient seemed to have worsening cough productive of clear sputum. As per daughter, patient also seems to have been coughing whenever she eats food. She normally eats soft foods at home. As per daughter, patient does not seem to have any pain, nausea or vomiting. Unable to get full ROS due to patient's dementia. (17 Nov 2019 17:25)      PAST MEDICAL & SURGICAL HISTORY:  HLD (hyperlipidemia)  Dementia  Hypertension        MEDICATIONS  (STANDING):  albuterol/ipratropium for Nebulization 3 milliLiter(s) Nebulizer every 6 hours  amLODIPine   Tablet 10 milliGRAM(s) Oral daily  aspirin enteric coated 81 milliGRAM(s) Oral daily  cholecalciferol 1000 Unit(s) Oral daily  donepezil 5 milliGRAM(s) Oral at bedtime  heparin  Infusion.  Unit(s)/Hr (5 mL/Hr) IV Continuous <Continuous>  insulin lispro (HumaLOG) corrective regimen sliding scale   SubCutaneous every 6 hours  levothyroxine 25 MICROGram(s) Oral daily  multivitamin/minerals 1 Tablet(s) Oral daily  piperacillin/tazobactam IVPB.. 3.375 Gram(s) IV Intermittent every 12 hours    MEDICATIONS  (PRN):  heparin  Injectable 2700 Unit(s) IV Push every 6 hours PRN For aPTT less than 40      FAMILY HISTORY:No hx of CAD      SOCIAL HISTORY:    [x ] Non-smoker    [x ] Alcohol-denies    Allergies    No Known Allergies    Intolerances    	    REVIEW OF SYSTEMS:    [x ] Unable to obtain    PHYSICAL EXAM:  T(C): 36.4 (11-18-19 @ 11:39), Max: 37.3 (11-18-19 @ 05:29)  HR: 74 (11-18-19 @ 11:39) (61 - 108)  BP: 152/71 (11-18-19 @ 11:39) (147/89 - 185/75)  RR: 20 (11-18-19 @ 11:39) (18 - 20)  SpO2: 94% (11-18-19 @ 11:39) (94% - 100%)  Wt(kg): --  I&O's Summary      Appearance: Normal	  HEENT:   Normal oral mucosa, PERRL, EOMI	  Lymphatic: No lymphadenopathy  Cardiovascular: Normal S1 S2, No JVD, No murmurs, No edema  Respiratory: B/L ronchi  Gastrointestinal:  Soft, Non-tender, + BS	  Skin: No rashes, No ecchymoses, No cyanosis	  Extremities: Normal range of motion, No clubbing, cyanosis or edema  Vascular: Peripheral pulses palpable 2+ bilaterally    	  LABS:	 	  CARDIAC MARKERS ( 18 Nov 2019 06:28 )  3.290 ng/mL / x     / 273 U/L / x     / 8.2 ng/mL  CARDIAC MARKERS ( 17 Nov 2019 17:56 )  3.780 ng/mL / x     / 306 U/L / x     / 8.5 ng/mL  CARDIAC MARKERS ( 17 Nov 2019 13:39 )  4.120 ng/mL / x     / x     / x     / x                                  11.9   7.95  )-----------( 182      ( 18 Nov 2019 06:28 )             38.3     11-18    145  |  113<H>  |  49<H>  ----------------------------<  95  3.3<L>   |  21<L>  |  1.65<H>    Ca    8.6      18 Nov 2019 06:28  Phos  3.9     11-18  Mg     2.2     11-18    TPro  6.4  /  Alb  3.4<L>  /  TBili  1.7<H>  /  DBili  x   /  AST  101<H>  /  ALT  86<H>  /  AlkPhos  208<H>  11-18      Lipid Profile: Cholesterol 175  LDL 82  HDL 76  TG 83    HgA1c: Hemoglobin A1C, Whole Blood: 5.9 % (11-18 @ 10:58)    TSH: Thyroid Stimulating Hormone, Serum: 16.40 uU/mL (11-18 @ 06:28)        EXAM:  XR CHEST AP OR PA 1V                            PROCEDURE DATE:  11/17/2019          INTERPRETATION:  Difficulty breathing.    AP chest.    Chin obscures portions both apices.  Heart enlarged despite AP film. Atherosclerotic aorta. Focal   consolidation right lower lobe presumed pneumonia in appropriate clinical   setting. Please image to resolution. Streaky fibrotic atelectatic change   right perihilar region. Trace bilateral pleural effusions. Thoracic   dextroscoliosis. Old left clavicle fracture.    Impression: Cardiomegaly. Right lower lobe pneumonia. Trace pleural   effusions.    PT/INR - ( 17 Nov 2019 13:39 )   PT: 14.3 sec;   INR: 1.28 ratio         PTT - ( 18 Nov 2019 10:54 )  PTT:66.6 sec

## 2019-11-19 DIAGNOSIS — Z71.89 OTHER SPECIFIED COUNSELING: ICD-10-CM

## 2019-11-19 LAB
GLUCOSE BLDC GLUCOMTR-MCNC: 127 MG/DL — HIGH (ref 70–99)
GLUCOSE BLDC GLUCOMTR-MCNC: 75 MG/DL — SIGNIFICANT CHANGE UP (ref 70–99)
GLUCOSE BLDC GLUCOMTR-MCNC: 85 MG/DL — SIGNIFICANT CHANGE UP (ref 70–99)
GLUCOSE BLDC GLUCOMTR-MCNC: 86 MG/DL — SIGNIFICANT CHANGE UP (ref 70–99)
GLUCOSE BLDC GLUCOMTR-MCNC: 90 MG/DL — SIGNIFICANT CHANGE UP (ref 70–99)
HCT VFR BLD CALC: 38.9 % — SIGNIFICANT CHANGE UP (ref 34.5–45)
HGB BLD-MCNC: 12 G/DL — SIGNIFICANT CHANGE UP (ref 11.5–15.5)
MCHC RBC-ENTMCNC: 27.8 PG — SIGNIFICANT CHANGE UP (ref 27–34)
MCHC RBC-ENTMCNC: 30.8 GM/DL — LOW (ref 32–36)
MCV RBC AUTO: 90.3 FL — SIGNIFICANT CHANGE UP (ref 80–100)
NRBC # BLD: 0 /100 WBCS — SIGNIFICANT CHANGE UP (ref 0–0)
PLATELET # BLD AUTO: 178 K/UL — SIGNIFICANT CHANGE UP (ref 150–400)
RBC # BLD: 4.31 M/UL — SIGNIFICANT CHANGE UP (ref 3.8–5.2)
RBC # FLD: 14.7 % — HIGH (ref 10.3–14.5)
WBC # BLD: 7.51 K/UL — SIGNIFICANT CHANGE UP (ref 3.8–10.5)
WBC # FLD AUTO: 7.51 K/UL — SIGNIFICANT CHANGE UP (ref 3.8–10.5)

## 2019-11-19 RX ORDER — LANOLIN ALCOHOL/MO/W.PET/CERES
3 CREAM (GRAM) TOPICAL AT BEDTIME
Refills: 0 | Status: DISCONTINUED | OUTPATIENT
Start: 2019-11-19 | End: 2019-11-21

## 2019-11-19 RX ORDER — LANOLIN ALCOHOL/MO/W.PET/CERES
3 CREAM (GRAM) TOPICAL ONCE
Refills: 0 | Status: COMPLETED | OUTPATIENT
Start: 2019-11-19 | End: 2019-11-19

## 2019-11-19 RX ORDER — INSULIN LISPRO 100/ML
VIAL (ML) SUBCUTANEOUS
Refills: 0 | Status: DISCONTINUED | OUTPATIENT
Start: 2019-11-19 | End: 2019-11-21

## 2019-11-19 RX ORDER — PIPERACILLIN AND TAZOBACTAM 4; .5 G/20ML; G/20ML
3.38 INJECTION, POWDER, LYOPHILIZED, FOR SOLUTION INTRAVENOUS ONCE
Refills: 0 | Status: COMPLETED | OUTPATIENT
Start: 2019-11-19 | End: 2019-11-19

## 2019-11-19 RX ADMIN — Medication 1000 UNIT(S): at 18:09

## 2019-11-19 RX ADMIN — Medication 3 MILLILITER(S): at 22:06

## 2019-11-19 RX ADMIN — PIPERACILLIN AND TAZOBACTAM 200 GRAM(S): 4; .5 INJECTION, POWDER, LYOPHILIZED, FOR SOLUTION INTRAVENOUS at 18:56

## 2019-11-19 RX ADMIN — Medication 1 TABLET(S): at 18:09

## 2019-11-19 RX ADMIN — DONEPEZIL HYDROCHLORIDE 5 MILLIGRAM(S): 10 TABLET, FILM COATED ORAL at 22:58

## 2019-11-19 RX ADMIN — Medication 3 MILLILITER(S): at 09:22

## 2019-11-19 RX ADMIN — AMLODIPINE BESYLATE 10 MILLIGRAM(S): 2.5 TABLET ORAL at 06:16

## 2019-11-19 RX ADMIN — Medication 3 MILLILITER(S): at 03:42

## 2019-11-19 RX ADMIN — Medication 1 TABLET(S): at 20:03

## 2019-11-19 RX ADMIN — Medication 12.5 MILLIGRAM(S): at 06:17

## 2019-11-19 RX ADMIN — Medication 12.5 MICROGRAM(S): at 22:57

## 2019-11-19 RX ADMIN — Medication 12.5 MILLIGRAM(S): at 18:04

## 2019-11-19 RX ADMIN — PIPERACILLIN AND TAZOBACTAM 25 GRAM(S): 4; .5 INJECTION, POWDER, LYOPHILIZED, FOR SOLUTION INTRAVENOUS at 06:17

## 2019-11-19 RX ADMIN — Medication 3 MILLILITER(S): at 14:40

## 2019-11-19 RX ADMIN — ATORVASTATIN CALCIUM 10 MILLIGRAM(S): 80 TABLET, FILM COATED ORAL at 22:58

## 2019-11-19 NOTE — PROGRESS NOTE ADULT - SUBJECTIVE AND OBJECTIVE BOX
PGY1 Note discussed with supervising resident and primary attending.    Patient is a 89y old  Female who presents with a chief complaint of shortness of breath and cough (19 Nov 2019 12:11)      INTERVAL HPI/OVERNIGHT EVENTS:  -No major event overnight.    MEDICATIONS  (STANDING):  albuterol/ipratropium for Nebulization 3 milliLiter(s) Nebulizer every 6 hours  amLODIPine   Tablet 10 milliGRAM(s) Oral daily  aspirin enteric coated 81 milliGRAM(s) Oral daily  atorvastatin 10 milliGRAM(s) Oral at bedtime  cholecalciferol 1000 Unit(s) Oral daily  dextrose 5% + sodium chloride 0.45%. 1000 milliLiter(s) (50 mL/Hr) IV Continuous <Continuous>  donepezil 5 milliGRAM(s) Oral at bedtime  insulin lispro (HumaLOG) corrective regimen sliding scale   SubCutaneous Before meals and at bedtime  levothyroxine Injectable 12.5 MICROGram(s) IV Push at bedtime  metoprolol tartrate 12.5 milliGRAM(s) Oral two times a day  multivitamin/minerals 1 Tablet(s) Oral daily  piperacillin/tazobactam IVPB.. 3.375 Gram(s) IV Intermittent every 12 hours    MEDICATIONS  (PRN):      Allergies    No Known Allergies    Intolerances        REVIEW OF SYSTEMS:  CONSTITUTIONAL: No fever,thin  RESPIRATORY: (+) cough, no wheezing, chills or hemoptysis; No shortness of breath  CARDIOVASCULAR: No chest pain, palpitations, dizziness, or leg swelling  GASTROINTESTINAL: No abdominal or epigastric pain. No nausea, vomiting, or hematemesis; No diarrhea or constipation. No melena or hematochezia.  NEUROLOGICAL: No headaches,  or tremors  SKIN: No itching, burning, rashes, or lesions     Vital Signs Last 24 Hrs  T(C): 36.8 (19 Nov 2019 15:01), Max: 36.8 (19 Nov 2019 15:01)  T(F): 98.3 (19 Nov 2019 15:01), Max: 98.3 (19 Nov 2019 15:01)  HR: 96 (19 Nov 2019 15:01) (67 - 96)  BP: 172/81 (19 Nov 2019 15:01) (147/63 - 177/65)  BP(mean): --  RR: 18 (19 Nov 2019 15:01) (16 - 18)  SpO2: 96% (19 Nov 2019 15:01) (90% - 97%)    PHYSICAL EXAM:  GENERAL: NAD, well-groomed,   HEAD:  Atraumatic, Normocephalic  EYES: conjunctiva and sclera clear  NECK: Supple, No JVD, Normal thyroid  CHEST/LUNG: Clear to percussion bilaterally; Couldn't listen carefully as pt was not cooperative  HEART: Regular rate and rhythm; No murmurs, rubs, or gallops  ABDOMEN: Soft, Nontender, Nondistended; Bowel sounds present  NERVOUS SYSTEM:  Alert & Oriented X1,   EXTREMITIES:  2+ Peripheral Pulses, No clubbing, cyanosis, or edema  SKIN;    LABS:                        12.0   7.51  )-----------( 178      ( 19 Nov 2019 07:35 )             38.9     11-18    145  |  113<H>  |  49<H>  ----------------------------<  95  3.3<L>   |  21<L>  |  1.65<H>    Ca    8.6      18 Nov 2019 06:28  Phos  3.9     11-18  Mg     2.2     11-18    TPro  6.4  /  Alb  3.4<L>  /  TBili  1.7<H>  /  DBili  x   /  AST  101<H>  /  ALT  86<H>  /  AlkPhos  208<H>  11-18    PTT - ( 18 Nov 2019 18:26 )  PTT:28.8 sec    CAPILLARY BLOOD GLUCOSE      POCT Blood Glucose.: 90 mg/dL (19 Nov 2019 17:04)  POCT Blood Glucose.: 85 mg/dL (19 Nov 2019 12:08)  POCT Blood Glucose.: 75 mg/dL (19 Nov 2019 05:49)  POCT Blood Glucose.: 86 mg/dL (19 Nov 2019 00:23)  POCT Blood Glucose.: 71 mg/dL (18 Nov 2019 18:44)      RADIOLOGY & ADDITIONAL TESTS:    Imaging Personally Reviewed:  [ ] YES  [ ] NO    Consultant(s) Notes Reviewed:  [ ] YES  [ ] NO

## 2019-11-19 NOTE — PROGRESS NOTE ADULT - ASSESSMENT
89 year old female, with PMH of dementia AAO x1 baseline, hypothyroidism, HTN, HLD and has HHA 9 hours Mon-Sat, BIBEMS from home for shortness of breath and cough,RLL pneumonia,NSTEMI due to demand ischemia.  1.D/W daughter at bedside wants no aggressive measures.  2.ABX.  3.Cont asa, blocker,statin.  4.Hypothyropidism-synthroid.  5.Palliative f/u.  6.PPI.

## 2019-11-19 NOTE — PROGRESS NOTE ADULT - PROBLEM SELECTOR PLAN 1
Tropinin downtrending  Heparin drip discontinued in setting of pt DNR/DNI, hospice per HCP   c/w ASA, Statin, BB  Cardiology Dr. Singh following

## 2019-11-19 NOTE — DIETITIAN INITIAL EVALUATION ADULT. - NS FNS WEIGHT USED FOR CALC
Ht=4' 8"   IBW=88 lb   Admission wt=76 lb    BMI=17.3   Current wt=84.4 lb 11/19/19    ? changes, may due to scale variance e/current

## 2019-11-19 NOTE — DIETITIAN INITIAL EVALUATION ADULT. - FACTORS AFF FOOD INTAKE
difficulty swallowing/change in mental status/difficulty with food procurement/preparation/Yazidi/ethnic/cultural/personal food preferences/acute on chronic comorbidities, advanced age/difficulty chewing/other (specify)/difficulty feeding self

## 2019-11-19 NOTE — DIETITIAN INITIAL EVALUATION ADULT. - PHYSICAL APPEARANCE
debilitated/emaciated/other (specify)/cachectic per MD Skin intact  Nutrition Focused Physical Exam not feasible at present, severe muscle wasting + subcutaneous fat loss observed

## 2019-11-19 NOTE — CONSULT NOTE ADULT - ASSESSMENT
89 year old female, with PMH of dementia AAO x1 baseline, hypothyroidism, HTN, HLD and has HHA 9 hours Mon-Sat, BIBEMS from home for shortness of breath and cough,RLL pneumonia,NSTEMI due to demand ischemia.  1.D/W daughter at bedside wants no aggressive measures.  2.ABX.  3.D/C heparin drip.  4.Cont asa, blocker,statin.  5.Hypothyropidism-synthroid.  6.Palliative eval.  7.PPI.
Fall, initial encounter
Right lower lobe Pneumonia - possibly aspiration    Plan - increase Augmentin to 500mgs po BID x 5 days  DC planning   Reconsult prn.

## 2019-11-19 NOTE — PROGRESS NOTE ADULT - SUBJECTIVE AND OBJECTIVE BOX
CHIEF COMPLAINT:Patient is a 89y old  Female who presents with a chief complaint of shortness of breath and cough.Pt appears comfortable.    	  REVIEW OF SYSTEMS:  [ x] Unable to obtain    PHYSICAL EXAM:  T(C): 36.6 (11-19-19 @ 10:15), Max: 36.6 (11-19-19 @ 05:00)  HR: 67 (11-19-19 @ 10:15) (67 - 81)  BP: 147/63 (11-19-19 @ 10:15) (147/63 - 177/65)  RR: 18 (11-19-19 @ 10:15) (16 - 20)  SpO2: 96% (11-19-19 @ 10:15) (90% - 98%)      Appearance: Normal	  HEENT:   Normal oral mucosa, PERRL, EOMI	  Lymphatic: No lymphadenopathy  Cardiovascular: Normal S1 S2, No JVD, No murmurs, No edema  Respiratory: B/L ronchi  Gastrointestinal:  Soft, Non-tender, + BS	  Skin: No rashes, No ecchymoses, No cyanosis	  Extremities: Normal range of motion, No clubbing, cyanosis or edema  Vascular: Peripheral pulses palpable 2+ bilaterally    MEDICATIONS  (STANDING):  albuterol/ipratropium for Nebulization 3 milliLiter(s) Nebulizer every 6 hours  amLODIPine   Tablet 10 milliGRAM(s) Oral daily  aspirin enteric coated 81 milliGRAM(s) Oral daily  atorvastatin 10 milliGRAM(s) Oral at bedtime  cholecalciferol 1000 Unit(s) Oral daily  dextrose 5% + sodium chloride 0.45%. 1000 milliLiter(s) (50 mL/Hr) IV Continuous <Continuous>  donepezil 5 milliGRAM(s) Oral at bedtime  insulin lispro (HumaLOG) corrective regimen sliding scale   SubCutaneous Before meals and at bedtime  levothyroxine Injectable 12.5 MICROGram(s) IV Push at bedtime  metoprolol tartrate 12.5 milliGRAM(s) Oral two times a day  multivitamin/minerals 1 Tablet(s) Oral daily  piperacillin/tazobactam IVPB.. 3.375 Gram(s) IV Intermittent every 12 hours      	  LABS:	 	      CARDIAC MARKERS ( 18 Nov 2019 06:28 )  3.290 ng/mL / x     / 273 U/L / x     / 8.2 ng/mL  CARDIAC MARKERS ( 17 Nov 2019 17:56 )  3.780 ng/mL / x     / 306 U/L / x     / 8.5 ng/mL  CARDIAC MARKERS ( 17 Nov 2019 13:39 )  4.120 ng/mL / x     / x     / x     / x                                    12.0   7.51  )-----------( 178      ( 19 Nov 2019 07:35 )             38.9     11-18    145  |  113<H>  |  49<H>  ----------------------------<  95  3.3<L>   |  21<L>  |  1.65<H>    Ca    8.6      18 Nov 2019 06:28  Phos  3.9     11-18  Mg     2.2     11-18    TPro  6.4  /  Alb  3.4<L>  /  TBili  1.7<H>  /  DBili  x   /  AST  101<H>  /  ALT  86<H>  /  AlkPhos  208<H>  11-18    proBNP:   Lipid Profile: Cholesterol 175  LDL 82  HDL 76  TG 83    HgA1c: Hemoglobin A1C, Whole Blood: 5.9 % (11-18 @ 10:58)    TSH: Thyroid Stimulating Hormone, Serum: 16.40 uU/mL (11-18 @ 06:28)

## 2019-11-19 NOTE — DIETITIAN INITIAL EVALUATION ADULT. - OTHER INFO
Pt alert, confused, lives home with family, HHA help PTA, spoke with daughter at bedside; poor appetite x long time, worsening x 5d, wt loss reported ( details unclear), often choking with food, water intake, denied GI distress at present; s/p swallow evaluation with NPO recommended 11/18/19, s/p Palliative consult, Severe Malnutrition, comfort feed, no tuber feeding per HCP according to MD, po diet started, observed breakfast <25% intake

## 2019-11-19 NOTE — DIETITIAN INITIAL EVALUATION ADULT. - PERTINENT MEDS FT
MEDICATIONS  (STANDING):  albuterol/ipratropium for Nebulization 3 milliLiter(s) Nebulizer every 6 hours  amLODIPine   Tablet 10 milliGRAM(s) Oral daily  aspirin enteric coated 81 milliGRAM(s) Oral daily  atorvastatin 10 milliGRAM(s) Oral at bedtime  cholecalciferol 1000 Unit(s) Oral daily  dextrose 5% + sodium chloride 0.45%. 1000 milliLiter(s) (50 mL/Hr) IV Continuous <Continuous>  donepezil 5 milliGRAM(s) Oral at bedtime  insulin lispro (HumaLOG) corrective regimen sliding scale   SubCutaneous every 6 hours  levothyroxine Injectable 12.5 MICROGram(s) IV Push at bedtime  metoprolol tartrate 12.5 milliGRAM(s) Oral two times a day  multivitamin/minerals 1 Tablet(s) Oral daily  piperacillin/tazobactam IVPB.. 3.375 Gram(s) IV Intermittent every 12 hours

## 2019-11-19 NOTE — CONSULT NOTE ADULT - SUBJECTIVE AND OBJECTIVE BOX
HPI:  Patient is a 89 year old female, with PMH of dementia AAO x1 baseline, hypothyroidism, HTN, HLD and has HHA 9 hours Mon-Sat, BIBEMS from home for shortness of breath and cough. As per daughter at bedside, patient has had a chronic cough for some time. This morning, patient seemed to have worsening cough productive of clear sputum. As per daughter, patient also seems to have been coughing whenever she eats food. She normally eats soft foods at home. As per daughter, patient does not seem to have any pain, nausea or vomiting. Unable to get full ROS due to patient's dementia. (17 Nov 2019 17:25)      PAST MEDICAL & SURGICAL HISTORY:  HLD (hyperlipidemia)  Dementia  Hypertension  No significant past surgical history      No Known Allergies      Meds:  albuterol/ipratropium for Nebulization 3 milliLiter(s) Nebulizer every 6 hours  amLODIPine   Tablet 10 milliGRAM(s) Oral daily  aspirin enteric coated 81 milliGRAM(s) Oral daily  atorvastatin 10 milliGRAM(s) Oral at bedtime  cholecalciferol 1000 Unit(s) Oral daily  dextrose 5% + sodium chloride 0.45%. 1000 milliLiter(s) IV Continuous <Continuous>  donepezil 5 milliGRAM(s) Oral at bedtime  insulin lispro (HumaLOG) corrective regimen sliding scale   SubCutaneous Before meals and at bedtime  levothyroxine Injectable 12.5 MICROGram(s) IV Push at bedtime  metoprolol tartrate 12.5 milliGRAM(s) Oral two times a day  multivitamin/minerals 1 Tablet(s) Oral daily      SOCIAL HISTORY:  Smoker:  YES / NO        PACK YEARS:                         WHEN QUIT?  ETOH use:  YES / NO               FREQUENCY / QUANTITY:  Ilicit Drug use:  YES / NO  Occupation:  Assisted device use (Cane / Walker):  Live with:    FAMILY HISTORY:      VITALS:  Vital Signs Last 24 Hrs  T(C): 36.8 (19 Nov 2019 15:01), Max: 36.8 (19 Nov 2019 15:01)  T(F): 98.3 (19 Nov 2019 15:01), Max: 98.3 (19 Nov 2019 15:01)  HR: 89 (19 Nov 2019 18:02) (67 - 96)  BP: 155/70 (19 Nov 2019 18:02) (147/63 - 177/65)  BP(mean): 93 (19 Nov 2019 18:02) (93 - 93)  RR: 18 (19 Nov 2019 15:01) (16 - 18)  SpO2: 96% (19 Nov 2019 15:01) (90% - 97%)    LABS/DIAGNOSTIC TESTS:                          12.0   7.51  )-----------( 178      ( 19 Nov 2019 07:35 )             38.9     WBC Count: 7.51 K/uL (11-19 @ 07:35)  WBC Count: 7.95 K/uL (11-18 @ 06:28)  WBC Count: 8.65 K/uL (11-17 @ 22:31)  WBC Count: 8.11 K/uL (11-17 @ 13:39)      11-18    145  |  113<H>  |  49<H>  ----------------------------<  95  3.3<L>   |  21<L>  |  1.65<H>    Ca    8.6      18 Nov 2019 06:28  Phos  3.9     11-18  Mg     2.2     11-18    TPro  6.4  /  Alb  3.4<L>  /  TBili  1.7<H>  /  DBili  x   /  AST  101<H>  /  ALT  86<H>  /  AlkPhos  208<H>  11-18          LIVER FUNCTIONS - ( 18 Nov 2019 06:28 )  Alb: 3.4 g/dL / Pro: 6.4 g/dL / ALK PHOS: 208 U/L / ALT: 86 U/L DA / AST: 101 U/L / GGT: x             PTT - ( 18 Nov 2019 18:26 )  PTT:28.8 sec    LACTATE:    ABG -     CULTURES:   .Blood  11-17 @ 23:04   No growth to date.  --  --      .Blood  11-17 @ 23:02   No growth to date.  --  --      .Urine  11-17 @ 22:50   <10,000 CFU/mL Normal Urogenital Vernell  --  --          RADIOLOGY:< from: Xray Chest 1 View AP/PA (11.17.19 @ 14:05) >  EXAM:  XR CHEST AP OR PA 1V                            PROCEDURE DATE:  11/17/2019          INTERPRETATION:  Difficulty breathing.    AP chest.    Chin obscures portions both apices.  Heart enlarged despite AP film. Atherosclerotic aorta. Focal   consolidation right lower lobe presumed pneumonia in appropriate clinical   setting. Please image to resolution. Streaky fibrotic atelectatic change   right perihilar region. Trace bilateral pleural effusions. Thoracic   dextroscoliosis. Old left clavicle fracture.    Impression: Cardiomegaly. Right lower lobe pneumonia. Trace pleural   effusions.                AMALIA GUTIERREZ M.D., ATTENDING RADIOLOGIST  This document has been electronically signed. Nov 17 2019  2:11PM          < end of copied text >        ROS  [  ] UNABLE TO ELICIT HPI:  Patient is a 89 year old female, with PMH of dementia AAO x1 baseline, hypothyroidism, HTN, HLD and has HHA 9 hours Mon-Sat, BIBEMS from home for shortness of breath and cough. As per daughter at bedside, patient has had a chronic cough for some time. This morning, patient seemed to have worsening cough productive of clear sputum. As per daughter, patient also seems to have been coughing whenever she eats food. She normally eats soft foods at home. As per daughter, patient does not seem to have any pain, nausea or vomiting. Unable to get full ROS due to patient's dementia. (17 Nov 2019 17:25)    History as above, She is unable       PAST MEDICAL & SURGICAL HISTORY:  HLD (hyperlipidemia)  Dementia  Hypertension  No significant past surgical history      No Known Allergies      Meds:  albuterol/ipratropium for Nebulization 3 milliLiter(s) Nebulizer every 6 hours  amLODIPine   Tablet 10 milliGRAM(s) Oral daily  aspirin enteric coated 81 milliGRAM(s) Oral daily  atorvastatin 10 milliGRAM(s) Oral at bedtime  cholecalciferol 1000 Unit(s) Oral daily  dextrose 5% + sodium chloride 0.45%. 1000 milliLiter(s) IV Continuous <Continuous>  donepezil 5 milliGRAM(s) Oral at bedtime  insulin lispro (HumaLOG) corrective regimen sliding scale   SubCutaneous Before meals and at bedtime  levothyroxine Injectable 12.5 MICROGram(s) IV Push at bedtime  metoprolol tartrate 12.5 milliGRAM(s) Oral two times a day  multivitamin/minerals 1 Tablet(s) Oral daily      SOCIAL HISTORY:  Smoker:  YES / NO        PACK YEARS:                         WHEN QUIT?  ETOH use:  YES / NO               FREQUENCY / QUANTITY:  Ilicit Drug use:  YES / NO  Occupation:  Assisted device use (Cane / Walker):  Live with:    FAMILY HISTORY:      VITALS:  Vital Signs Last 24 Hrs  T(C): 36.8 (19 Nov 2019 15:01), Max: 36.8 (19 Nov 2019 15:01)  T(F): 98.3 (19 Nov 2019 15:01), Max: 98.3 (19 Nov 2019 15:01)  HR: 89 (19 Nov 2019 18:02) (67 - 96)  BP: 155/70 (19 Nov 2019 18:02) (147/63 - 177/65)  BP(mean): 93 (19 Nov 2019 18:02) (93 - 93)  RR: 18 (19 Nov 2019 15:01) (16 - 18)  SpO2: 96% (19 Nov 2019 15:01) (90% - 97%)    LABS/DIAGNOSTIC TESTS:                          12.0   7.51  )-----------( 178      ( 19 Nov 2019 07:35 )             38.9     WBC Count: 7.51 K/uL (11-19 @ 07:35)  WBC Count: 7.95 K/uL (11-18 @ 06:28)  WBC Count: 8.65 K/uL (11-17 @ 22:31)  WBC Count: 8.11 K/uL (11-17 @ 13:39)      11-18    145  |  113<H>  |  49<H>  ----------------------------<  95  3.3<L>   |  21<L>  |  1.65<H>    Ca    8.6      18 Nov 2019 06:28  Phos  3.9     11-18  Mg     2.2     11-18    TPro  6.4  /  Alb  3.4<L>  /  TBili  1.7<H>  /  DBili  x   /  AST  101<H>  /  ALT  86<H>  /  AlkPhos  208<H>  11-18          LIVER FUNCTIONS - ( 18 Nov 2019 06:28 )  Alb: 3.4 g/dL / Pro: 6.4 g/dL / ALK PHOS: 208 U/L / ALT: 86 U/L DA / AST: 101 U/L / GGT: x             PTT - ( 18 Nov 2019 18:26 )  PTT:28.8 sec    LACTATE:    ABG -     CULTURES:   .Blood  11-17 @ 23:04   No growth to date.  --  --      .Blood  11-17 @ 23:02   No growth to date.  --  --      .Urine  11-17 @ 22:50   <10,000 CFU/mL Normal Urogenital Vernell  --  --          RADIOLOGY:< from: Xray Chest 1 View AP/PA (11.17.19 @ 14:05) >  EXAM:  XR CHEST AP OR PA 1V                            PROCEDURE DATE:  11/17/2019          INTERPRETATION:  Difficulty breathing.    AP chest.    Chin obscures portions both apices.  Heart enlarged despite AP film. Atherosclerotic aorta. Focal   consolidation right lower lobe presumed pneumonia in appropriate clinical   setting. Please image to resolution. Streaky fibrotic atelectatic change   right perihilar region. Trace bilateral pleural effusions. Thoracic   dextroscoliosis. Old left clavicle fracture.    Impression: Cardiomegaly. Right lower lobe pneumonia. Trace pleural   effusions.                AMALIA GUTIERREZ M.D., ATTENDING RADIOLOGIST  This document has been electronically signed. Nov 17 2019  2:11PM          < end of copied text >        ROS  [  ] UNABLE TO ELICIT HPI:  Patient is a 89 year old female, with PMH of dementia AAO x1 baseline, hypothyroidism, HTN, HLD and has HHA 9 hours Mon-Sat, BIBEMS from home for shortness of breath and cough. As per daughter at bedside, patient has had a chronic cough for some time. This morning, patient seemed to have worsening cough productive of clear sputum. As per daughter, patient also seems to have been coughing whenever she eats food. She normally eats soft foods at home. As per daughter, patient does not seem to have any pain, nausea or vomiting. Unable to get full ROS due to patient's dementia. (17 Nov 2019 17:25)    History as above, She is unable to give any history at all, her family member is at the bedside and states she might be going to hospice, Asked to eval pt as she was found to have pneumonia , she was switched  to augmentin 500mgs po qd.       PAST MEDICAL & SURGICAL HISTORY:  HLD (hyperlipidemia)  Dementia  Hypertension  No significant past surgical history      No Known Allergies      Meds:  albuterol/ipratropium for Nebulization 3 milliLiter(s) Nebulizer every 6 hours  amLODIPine   Tablet 10 milliGRAM(s) Oral daily  aspirin enteric coated 81 milliGRAM(s) Oral daily  atorvastatin 10 milliGRAM(s) Oral at bedtime  cholecalciferol 1000 Unit(s) Oral daily  dextrose 5% + sodium chloride 0.45%. 1000 milliLiter(s) IV Continuous <Continuous>  donepezil 5 milliGRAM(s) Oral at bedtime  insulin lispro (HumaLOG) corrective regimen sliding scale   SubCutaneous Before meals and at bedtime  levothyroxine Injectable 12.5 MICROGram(s) IV Push at bedtime  metoprolol tartrate 12.5 milliGRAM(s) Oral two times a day  multivitamin/minerals 1 Tablet(s) Oral daily      SOCIAL HISTORY: unknown    FAMILY HISTORY: unknown      VITALS:  Vital Signs Last 24 Hrs  T(C): 36.8 (19 Nov 2019 15:01), Max: 36.8 (19 Nov 2019 15:01)  T(F): 98.3 (19 Nov 2019 15:01), Max: 98.3 (19 Nov 2019 15:01)  HR: 89 (19 Nov 2019 18:02) (67 - 96)  BP: 155/70 (19 Nov 2019 18:02) (147/63 - 177/65)  BP(mean): 93 (19 Nov 2019 18:02) (93 - 93)  RR: 18 (19 Nov 2019 15:01) (16 - 18)  SpO2: 96% (19 Nov 2019 15:01) (90% - 97%)    LABS/DIAGNOSTIC TESTS:                          12.0   7.51  )-----------( 178      ( 19 Nov 2019 07:35 )             38.9     WBC Count: 7.51 K/uL (11-19 @ 07:35)  WBC Count: 7.95 K/uL (11-18 @ 06:28)  WBC Count: 8.65 K/uL (11-17 @ 22:31)  WBC Count: 8.11 K/uL (11-17 @ 13:39)      11-18    145  |  113<H>  |  49<H>  ----------------------------<  95  3.3<L>   |  21<L>  |  1.65<H>    Ca    8.6      18 Nov 2019 06:28  Phos  3.9     11-18  Mg     2.2     11-18    TPro  6.4  /  Alb  3.4<L>  /  TBili  1.7<H>  /  DBili  x   /  AST  101<H>  /  ALT  86<H>  /  AlkPhos  208<H>  11-18          LIVER FUNCTIONS - ( 18 Nov 2019 06:28 )  Alb: 3.4 g/dL / Pro: 6.4 g/dL / ALK PHOS: 208 U/L / ALT: 86 U/L DA / AST: 101 U/L / GGT: x             PTT - ( 18 Nov 2019 18:26 )  PTT:28.8 sec    LACTATE:    ABG -     CULTURES:   .Blood  11-17 @ 23:04   No growth to date.  --  --      .Blood  11-17 @ 23:02   No growth to date.  --  --      .Urine  11-17 @ 22:50   <10,000 CFU/mL Normal Urogenital Vernell  --  --          RADIOLOGY:< from: Xray Chest 1 View AP/PA (11.17.19 @ 14:05) >  EXAM:  XR CHEST AP OR PA 1V                            PROCEDURE DATE:  11/17/2019          INTERPRETATION:  Difficulty breathing.    AP chest.    Chin obscures portions both apices.  Heart enlarged despite AP film. Atherosclerotic aorta. Focal   consolidation right lower lobe presumed pneumonia in appropriate clinical   setting. Please image to resolution. Streaky fibrotic atelectatic change   right perihilar region. Trace bilateral pleural effusions. Thoracic   dextroscoliosis. Old left clavicle fracture.    Impression: Cardiomegaly. Right lower lobe pneumonia. Trace pleural   effusions.                AMALIA GUTIERREZ M.D., ATTENDING RADIOLOGIST  This document has been electronically signed. Nov 17 2019  2:11PM          < end of copied text >        ROS  [ x ] UNABLE TO ELICIT

## 2019-11-19 NOTE — CONSULT NOTE ADULT - GASTROINTESTINAL DETAILS
nontender/no rebound tenderness/soft/no rigidity/no distention/no masses palpable/no organomegaly/no guarding/bowel sounds normal

## 2019-11-19 NOTE — CHART NOTE - NSCHARTNOTEFT_GEN_A_CORE
Upon Nutritional Assessment by the Registered Dietitian your patient was determined to meet criteria / has evidence of the following diagnosis/diagnoses:          [ ]  Mild Protein Calorie Malnutrition        [ ]  Moderate Protein Calorie Malnutrition        [ X ] Severe Protein Calorie Malnutrition        [ ] Unspecified Protein Calorie Malnutrition        [ X ] Underweight / BMI <19        [ ] Morbid Obesity / BMI > 40      Findings as based on:  •  Comprehensive nutrition assessment and consultation  •  Calorie counts (nutrient intake analysis)  •  Food acceptance and intake status from observations by staff  •  Follow up  •  Patient education  •  Intervention secondary to interdisciplinary rounds  •   concerns      Treatment:    The following diet has been recommended: Add Ensure Pudding 120 ml x tid ( 510 kcal 12 g protein ) if medically feasible       PROVIDER Section:     By signing this assessment you are acknowledging and agree with the diagnosis/diagnoses assigned by the Registered Dietitian    Comments:

## 2019-11-19 NOTE — PROGRESS NOTE ADULT - ASSESSMENT
Patient is a 89 year old female, with PMH of dementia AAO x1 baseline, hypothyroidism, HTN, HLD and has HHA 9 hours Mon-Sat, BIBEMS from home for shortness of breath and cough.     Pt is planned for home hospice, wasting for set up.  was involved.

## 2019-11-20 DIAGNOSIS — E87.0 HYPEROSMOLALITY AND HYPERNATREMIA: ICD-10-CM

## 2019-11-20 LAB
ALBUMIN SERPL ELPH-MCNC: 3.1 G/DL — LOW (ref 3.5–5)
ALP SERPL-CCNC: 146 U/L — HIGH (ref 40–120)
ALT FLD-CCNC: 68 U/L DA — HIGH (ref 10–60)
ANION GAP SERPL CALC-SCNC: 10 MMOL/L — SIGNIFICANT CHANGE UP (ref 5–17)
AST SERPL-CCNC: 63 U/L — HIGH (ref 10–40)
BASOPHILS # BLD AUTO: 0.01 K/UL — SIGNIFICANT CHANGE UP (ref 0–0.2)
BASOPHILS NFR BLD AUTO: 0.1 % — SIGNIFICANT CHANGE UP (ref 0–2)
BILIRUB SERPL-MCNC: 1.4 MG/DL — HIGH (ref 0.2–1.2)
BUN SERPL-MCNC: 34 MG/DL — HIGH (ref 7–18)
CALCIUM SERPL-MCNC: 9 MG/DL — SIGNIFICANT CHANGE UP (ref 8.4–10.5)
CHLORIDE SERPL-SCNC: 116 MMOL/L — HIGH (ref 96–108)
CO2 SERPL-SCNC: 23 MMOL/L — SIGNIFICANT CHANGE UP (ref 22–31)
CREAT SERPL-MCNC: 1.37 MG/DL — HIGH (ref 0.5–1.3)
EOSINOPHIL # BLD AUTO: 0.05 K/UL — SIGNIFICANT CHANGE UP (ref 0–0.5)
EOSINOPHIL NFR BLD AUTO: 0.6 % — SIGNIFICANT CHANGE UP (ref 0–6)
GLUCOSE BLDC GLUCOMTR-MCNC: 105 MG/DL — HIGH (ref 70–99)
GLUCOSE BLDC GLUCOMTR-MCNC: 106 MG/DL — HIGH (ref 70–99)
GLUCOSE BLDC GLUCOMTR-MCNC: 236 MG/DL — HIGH (ref 70–99)
GLUCOSE SERPL-MCNC: 104 MG/DL — HIGH (ref 70–99)
HCT VFR BLD CALC: 38.4 % — SIGNIFICANT CHANGE UP (ref 34.5–45)
HGB BLD-MCNC: 11.9 G/DL — SIGNIFICANT CHANGE UP (ref 11.5–15.5)
IMM GRANULOCYTES NFR BLD AUTO: 0.5 % — SIGNIFICANT CHANGE UP (ref 0–1.5)
LYMPHOCYTES # BLD AUTO: 0.34 K/UL — LOW (ref 1–3.3)
LYMPHOCYTES # BLD AUTO: 4 % — LOW (ref 13–44)
MCHC RBC-ENTMCNC: 28.3 PG — SIGNIFICANT CHANGE UP (ref 27–34)
MCHC RBC-ENTMCNC: 31 GM/DL — LOW (ref 32–36)
MCV RBC AUTO: 91.4 FL — SIGNIFICANT CHANGE UP (ref 80–100)
MONOCYTES # BLD AUTO: 0.48 K/UL — SIGNIFICANT CHANGE UP (ref 0–0.9)
MONOCYTES NFR BLD AUTO: 5.7 % — SIGNIFICANT CHANGE UP (ref 2–14)
NEUTROPHILS # BLD AUTO: 7.52 K/UL — HIGH (ref 1.8–7.4)
NEUTROPHILS NFR BLD AUTO: 89.1 % — HIGH (ref 43–77)
NRBC # BLD: 0 /100 WBCS — SIGNIFICANT CHANGE UP (ref 0–0)
PLATELET # BLD AUTO: 181 K/UL — SIGNIFICANT CHANGE UP (ref 150–400)
POTASSIUM SERPL-MCNC: 3.1 MMOL/L — LOW (ref 3.5–5.3)
POTASSIUM SERPL-SCNC: 3.1 MMOL/L — LOW (ref 3.5–5.3)
PROT SERPL-MCNC: 6.1 G/DL — SIGNIFICANT CHANGE UP (ref 6–8.3)
RBC # BLD: 4.2 M/UL — SIGNIFICANT CHANGE UP (ref 3.8–5.2)
RBC # FLD: 14.7 % — HIGH (ref 10.3–14.5)
SODIUM SERPL-SCNC: 149 MMOL/L — HIGH (ref 135–145)
WBC # BLD: 8.44 K/UL — SIGNIFICANT CHANGE UP (ref 3.8–10.5)
WBC # FLD AUTO: 8.44 K/UL — SIGNIFICANT CHANGE UP (ref 3.8–10.5)

## 2019-11-20 RX ORDER — LEVOTHYROXINE SODIUM 125 MCG
25 TABLET ORAL ONCE
Refills: 0 | Status: DISCONTINUED | OUTPATIENT
Start: 2019-11-20 | End: 2019-11-21

## 2019-11-20 RX ORDER — POTASSIUM CHLORIDE 20 MEQ
40 PACKET (EA) ORAL EVERY 4 HOURS
Refills: 0 | Status: COMPLETED | OUTPATIENT
Start: 2019-11-20 | End: 2019-11-20

## 2019-11-20 RX ORDER — ATORVASTATIN CALCIUM 80 MG/1
1 TABLET, FILM COATED ORAL
Qty: 30 | Refills: 0
Start: 2019-11-20 | End: 2019-12-19

## 2019-11-20 RX ORDER — LEVOTHYROXINE SODIUM 125 MCG
25 TABLET ORAL
Refills: 0 | Status: DISCONTINUED | OUTPATIENT
Start: 2019-11-21 | End: 2019-11-21

## 2019-11-20 RX ORDER — ASPIRIN/CALCIUM CARB/MAGNESIUM 324 MG
81 TABLET ORAL DAILY
Refills: 0 | Status: DISCONTINUED | OUTPATIENT
Start: 2019-11-20 | End: 2019-11-21

## 2019-11-20 RX ORDER — SODIUM CHLORIDE 9 MG/ML
1000 INJECTION, SOLUTION INTRAVENOUS
Refills: 0 | Status: DISCONTINUED | OUTPATIENT
Start: 2019-11-20 | End: 2019-11-21

## 2019-11-20 RX ORDER — METOPROLOL TARTRATE 50 MG
0.5 TABLET ORAL
Qty: 30 | Refills: 0
Start: 2019-11-20 | End: 2019-12-19

## 2019-11-20 RX ORDER — LANOLIN ALCOHOL/MO/W.PET/CERES
1 CREAM (GRAM) TOPICAL
Qty: 0 | Refills: 0 | DISCHARGE
Start: 2019-11-20

## 2019-11-20 RX ADMIN — Medication 3 MILLIGRAM(S): at 22:41

## 2019-11-20 RX ADMIN — DONEPEZIL HYDROCHLORIDE 5 MILLIGRAM(S): 10 TABLET, FILM COATED ORAL at 22:41

## 2019-11-20 RX ADMIN — Medication 1000 UNIT(S): at 13:00

## 2019-11-20 RX ADMIN — ATORVASTATIN CALCIUM 10 MILLIGRAM(S): 80 TABLET, FILM COATED ORAL at 22:41

## 2019-11-20 RX ADMIN — Medication 3 MILLILITER(S): at 21:01

## 2019-11-20 RX ADMIN — Medication 2: at 22:40

## 2019-11-20 RX ADMIN — AMLODIPINE BESYLATE 10 MILLIGRAM(S): 2.5 TABLET ORAL at 05:24

## 2019-11-20 RX ADMIN — Medication 3 MILLILITER(S): at 15:09

## 2019-11-20 RX ADMIN — Medication 3 MILLILITER(S): at 08:16

## 2019-11-20 RX ADMIN — Medication 12.5 MILLIGRAM(S): at 05:24

## 2019-11-20 RX ADMIN — Medication 81 MILLIGRAM(S): at 13:01

## 2019-11-20 RX ADMIN — Medication 12.5 MILLIGRAM(S): at 17:46

## 2019-11-20 RX ADMIN — Medication 40 MILLIEQUIVALENT(S): at 13:02

## 2019-11-20 RX ADMIN — Medication 1 TABLET(S): at 05:24

## 2019-11-20 RX ADMIN — Medication 1 TABLET(S): at 13:00

## 2019-11-20 RX ADMIN — Medication 1 TABLET(S): at 17:46

## 2019-11-20 NOTE — PROGRESS NOTE ADULT - PROBLEM SELECTOR PROBLEM 7
Hypokalemia due to inadequate potassium intake

## 2019-11-20 NOTE — PROGRESS NOTE ADULT - PROBLEM SELECTOR PLAN 10
awaiting  hospice evaluation for home hospice  SW consulted  Appreciate Palliative
awaiting  hospice evaluation for home hospice  SW consulted  Appreciate Palliative
f/u  hospice evaluation for home hospice  SW consult  Appreciate Palliative

## 2019-11-20 NOTE — DISCHARGE NOTE PROVIDER - NSDCCPCAREPLAN_GEN_ALL_CORE_FT
PRINCIPAL DISCHARGE DIAGNOSIS  Diagnosis: Pneumonia, aspiration  Assessment and Plan of Treatment: Chest X-ray showed you have pneumonia. We think it's due to aspiration. Speech and swallow team was consulted and they recommended puree diet with nectar thick liquid. We gave IV antibiotics and switched to oral antibiotics. Please continue oral antibiotic daily until 11/23 and follow up with your primary care doctor.      SECONDARY DISCHARGE DIAGNOSES  Diagnosis: NSTEMI (non-ST elevated myocardial infarction)  Assessment and Plan of Treatment: Your cardiac enzyme troponin was elevated at 4.120 on admissoion. We monitored and trended down. cardiology was consulted and aspirin, statin, metoprolol was given. Please continue prescribed medications and follow up with your primary care doctor/ cardiologist in 1 week.    Diagnosis: Hypernatremia  Assessment and Plan of Treatment: Your serum sodium level was high, we gave IV fluid. Please follow up with your primary care doctor.    Diagnosis: DAMARI (acute kidney injury)  Assessment and Plan of Treatment: Your creatinin was high on admission, and it trended down, but till higher than normal. Please follow up with your primary care doctor within 1 week.    Diagnosis: Hypokalemia due to inadequate potassium intake  Assessment and Plan of Treatment: Your potassium level was low and it was replaced. Please follow up with your primary care doctor within 1 week.    Diagnosis: Dementia  Assessment and Plan of Treatment: You have history of dementia.We gave medication "donepezil". Please continue this medications and follow up with your primary care doctor.    Diagnosis: Hypothyroidism  Assessment and Plan of Treatment: We continue your home medications. Please continue with your home medications and follow up with your primary care doctor.    Diagnosis: Pneumonia, aspiration  Assessment and Plan of Treatment: Chest X-ray showed you have pneumonia. We think it's due to aspiration. Speech and swallow team was consulted and they recommended puree diet with nectar thick liquid. We gave IV antibiotics and switched to oral antibiotics. Please continue oral antibiotic daily until 11/23 and follow up with your primary care doctor. PRINCIPAL DISCHARGE DIAGNOSIS  Diagnosis: Pneumonia, aspiration  Assessment and Plan of Treatment: Chest X-ray showed you have pneumonia. We think it's due to aspiration. Speech and swallow team was consulted and they recommended puree diet with nectar thick liquid. We gave IV antibiotics and switched to oral antibiotics. Please continue oral antibiotic daily until 11/23 and follow up with your primary care doctor.      SECONDARY DISCHARGE DIAGNOSES  Diagnosis: NSTEMI (non-ST elevated myocardial infarction)  Assessment and Plan of Treatment: Your cardiac enzyme troponin was elevated at 4.120 on admissoion. We monitored and trended down. cardiology was consulted and aspirin, statin, metoprolol was given. Please continue prescribed medications and follow up with your primary care doctor/ cardiologist in 1 week.    Diagnosis: Hypernatremia  Assessment and Plan of Treatment: Your serum sodium level was high, we gave IV fluid. Please follow up with your primary care doctor.    Diagnosis: DAMARI (acute kidney injury)  Assessment and Plan of Treatment: Your creatinin was high on admission, and it trended down, but till higher than normal. Please follow up with your primary care doctor within 1 week.    Diagnosis: Hypokalemia due to inadequate potassium intake  Assessment and Plan of Treatment: Your potassium level was low and it was replaced. Please follow up with your primary care doctor within 1 week.    Diagnosis: Dementia  Assessment and Plan of Treatment: You have history of dementia.We gave medication "donepezil". Please continue this medications and follow up with your primary care doctor.    Diagnosis: Hypothyroidism  Assessment and Plan of Treatment: We continue your home medications. Please continue with your home medications and follow up with your primary care doctor.    Diagnosis: Hypertension  Assessment and Plan of Treatment: Please continue prescribed medications and follow up with your primary care doctor in 1 week. PRINCIPAL DISCHARGE DIAGNOSIS  Diagnosis: Pneumonia, aspiration  Assessment and Plan of Treatment: Chest X-ray showed you have pneumonia. We think it's due to aspiration. Speech and swallow team was consulted and they recommended puree diet with nectar thick liquid. We gave IV antibiotics and switched to oral antibiotics. Please continue oral antibiotic daily until 11/23 and follow up with your primary care doctor.      SECONDARY DISCHARGE DIAGNOSES  Diagnosis: NSTEMI (non-ST elevated myocardial infarction)  Assessment and Plan of Treatment: Your cardiac enzyme troponin was elevated at 4.120 on admissoion. We checked EKG and it didn't show ischemic changes. We monitored troponin and trended down, which suggests this condition is likely from trasient stress to your heart. Cardiology was consulted and aspirin, statin, metoprolol was recommended. Please continue prescribed medications and follow up with your primary care doctor/ cardiologist.    Diagnosis: Hypernatremia  Assessment and Plan of Treatment: Your serum sodium level was high, we gave IV fluid. Please follow up with your primary care doctor.    Diagnosis: DAMARI (acute kidney injury)  Assessment and Plan of Treatment: Your creatinin was high on admission, and it trended down, but till higher than normal. Please follow up with your primary care doctor within 1 week.    Diagnosis: Hypokalemia due to inadequate potassium intake  Assessment and Plan of Treatment: Your potassium level was low and it was replaced. Please follow up with your primary care doctor within 1 week.    Diagnosis: Dementia  Assessment and Plan of Treatment: You have history of dementia.We gave medication "donepezil". Please continue this medications and follow up with your primary care doctor.    Diagnosis: Hypothyroidism  Assessment and Plan of Treatment: We continue your home medications. Please continue with your home medications and follow up with your primary care doctor.    Diagnosis: Hypertension  Assessment and Plan of Treatment: Please continue prescribed medications and follow up with your primary care doctor in 1 week.
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
## 2019-11-20 NOTE — PROGRESS NOTE ADULT - PROBLEM SELECTOR PLAN 8
DNR/DNI/DNH , comfort feed.  no tube feeding   MOLST completed  Appreciate Palliative

## 2019-11-20 NOTE — PROGRESS NOTE ADULT - SUBJECTIVE AND OBJECTIVE BOX
PGY1 Note discussed with supervising resident and primary attending.    Patient is a 89y old  Female who presents with a chief complaint of shortness of breath and cough (20 Nov 2019 16:20)      INTERVAL HPI/OVERNIGHT EVENTS:   - Pt was moved from bed to chair  - Pt's breathing looks better than yesterday    MEDICATIONS  (STANDING):  albuterol/ipratropium for Nebulization 3 milliLiter(s) Nebulizer every 6 hours  amLODIPine   Tablet 10 milliGRAM(s) Oral daily  amoxicillin  500 milliGRAM(s)/clavulanate 1 Tablet(s) Oral two times a day  aspirin  chewable 81 milliGRAM(s) Oral daily  atorvastatin 10 milliGRAM(s) Oral at bedtime  cholecalciferol 1000 Unit(s) Oral daily  dextrose 5% + sodium chloride 0.45%. 1000 milliLiter(s) (50 mL/Hr) IV Continuous <Continuous>  donepezil 5 milliGRAM(s) Oral at bedtime  insulin lispro (HumaLOG) corrective regimen sliding scale   SubCutaneous Before meals and at bedtime  levothyroxine 25 MICROGram(s) Oral once  melatonin 3 milliGRAM(s) Oral at bedtime  metoprolol tartrate 12.5 milliGRAM(s) Oral two times a day  multivitamin/minerals 1 Tablet(s) Oral daily  potassium chloride   Powder 40 milliEquivalent(s) Oral every 4 hours    MEDICATIONS  (PRN):      Allergies    No Known Allergies    Intolerances        REVIEW OF SYSTEMS:  CONSTITUTIONAL: No fever,thin  RESPIRATORY: no cough, no wheezing, chills or hemoptysis; No shortness of breath  CARDIOVASCULAR: No chest pain, palpitations, dizziness, or leg swelling  GASTROINTESTINAL: No abdominal or epigastric pain. No nausea, vomiting, or hematemesis; No diarrhea or constipation. No melena or hematochezia.  NEUROLOGICAL: No headaches,  or tremors  SKIN: No itching, burning, rashes, or lesions     Vital Signs Last 24 Hrs  T(C): 36.8 (19 Nov 2019 15:01), Max: 36.8 (19 Nov 2019 15:01)  T(F): 98.3 (19 Nov 2019 15:01), Max: 98.3 (19 Nov 2019 15:01)  HR: 96 (19 Nov 2019 15:01) (67 - 96)  BP: 172/81 (19 Nov 2019 15:01) (147/63 - 177/65)  BP(mean): --  RR: 18 (19 Nov 2019 15:01) (16 - 18)  SpO2: 96% (19 Nov 2019 15:01) (90% - 97%)    PHYSICAL EXAM:  GENERAL: NAD, well-groomed,   HEAD:  Atraumatic, Normocephalic  EYES: conjunctiva and sclera clear  NECK: Supple, No JVD, Normal thyroid  CHEST/LUNG: Clear to percussion bilaterally; congested sound on left  HEART: Regular rate and rhythm; No murmurs, rubs, or gallops  ABDOMEN: Soft, Nontender, Nondistended; Bowel sounds present  NERVOUS SYSTEM:  Alert & Oriented X1,   EXTREMITIES:  2+ Peripheral Pulses, No clubbing, cyanosis, or edema  SKIN;      LABS:                        11.9   8.44  )-----------( 181      ( 20 Nov 2019 06:34 )             38.4     11-20    149<H>  |  116<H>  |  34<H>  ----------------------------<  104<H>  3.1<L>   |  23  |  1.37<H>    Ca    9.0      20 Nov 2019 06:34    TPro  6.1  /  Alb  3.1<L>  /  TBili  1.4<H>  /  DBili  x   /  AST  63<H>  /  ALT  68<H>  /  AlkPhos  146<H>  11-20    PTT - ( 18 Nov 2019 18:26 )  PTT:28.8 sec    CAPILLARY BLOOD GLUCOSE      POCT Blood Glucose.: 106 mg/dL (20 Nov 2019 12:16)  POCT Blood Glucose.: 105 mg/dL (20 Nov 2019 08:57)  POCT Blood Glucose.: 127 mg/dL (19 Nov 2019 22:29)  POCT Blood Glucose.: 90 mg/dL (19 Nov 2019 17:04)      RADIOLOGY & ADDITIONAL TESTS:    Imaging Personally Reviewed:  [ ] YES  [ ] NO    Consultant(s) Notes Reviewed:  [ ] YES  [ ] NO

## 2019-11-20 NOTE — PROGRESS NOTE ADULT - REASON FOR ADMISSION
shortness of breath and cough

## 2019-11-20 NOTE — DISCHARGE NOTE PROVIDER - NSDCMRMEDTOKEN_GEN_ALL_CORE_FT
amLODIPine 10 mg oral tablet: 1 tab(s) orally once a day  ammonium lactate 12% topical lotion: Apply topically to affected area 2 times a day  aspirin 81 mg oral delayed release tablet: 1 tab(s) orally once a day  cholecalciferol 1000 intl units oral capsule: 1 cap(s) orally once a day  clotrimazole 1% topical cream (obsolete): Apply topically to affected area  donepezil 5 mg oral tablet: 1 tab(s) orally once a day (at bedtime)  levothyroxine 25 mcg (0.025 mg) oral tablet: 1 tab(s) orally once a day  Multiple Vitamins with Minerals oral tablet: 1 tab(s) orally once a day  Proventil 2.5 mg/3 mL (0.083%) inhalation solution: 3 milliliter(s) inhaled every 6 hours amLODIPine 10 mg oral tablet: 1 tab(s) orally once a day  ammonium lactate 12% topical lotion: Apply topically to affected area 2 times a day  amoxicillin-clavulanate 500 mg-125 mg oral tablet: 1 tab(s) orally 2 times a day  aspirin 81 mg oral delayed release tablet: 1 tab(s) orally once a day  atorvastatin 10 mg oral tablet: 1 tab(s) orally once a day (at bedtime)  cholecalciferol 1000 intl units oral capsule: 1 cap(s) orally once a day  clotrimazole 1% topical cream (obsolete): Apply topically to affected area  donepezil 5 mg oral tablet: 1 tab(s) orally once a day (at bedtime)  levothyroxine 25 mcg (0.025 mg) oral tablet: 1 tab(s) orally once a day  melatonin 3 mg oral tablet: 1 tab(s) orally once a day (at bedtime)  metoprolol tartrate 25 mg oral tablet: 0.5 tab(s) orally 2 times a day   Multiple Vitamins with Minerals oral tablet: 1 tab(s) orally once a day  Proventil 2.5 mg/3 mL (0.083%) inhalation solution: 3 milliliter(s) inhaled every 6 hours amLODIPine 10 mg oral tablet: 1 tab(s) orally once a day  ammonium lactate 12% topical lotion: Apply topically to affected area 2 times a day  amoxicillin-clavulanate 500 mg-125 mg oral tablet: 1 tab(s) orally 2 times a day  aspirin 81 mg oral delayed release tablet: 1 tab(s) orally once a day  atorvastatin 10 mg oral tablet: 1 tab(s) orally once a day (at bedtime)  cholecalciferol 1000 intl units oral capsule: 1 cap(s) orally once a day  clotrimazole 1% topical cream (obsolete): Apply topically to affected area  donepezil 5 mg oral tablet: 1 tab(s) orally once a day (at bedtime)  ipratropium-albuterol 0.5 mg-2.5 mg/3 mLinhalation solution: 3 milliliter(s) inhaled every 6 hours  levothyroxine 25 mcg (0.025 mg) oral tablet: 1 tab(s) orally once a day  melatonin 3 mg oral tablet: 1 tab(s) orally once a day (at bedtime)  metoprolol tartrate 25 mg oral tablet: 0.5 tab(s) orally 2 times a day   Multiple Vitamins with Minerals oral tablet: 1 tab(s) orally once a day  Proventil 2.5 mg/3 mL (0.083%) inhalation solution: 3 milliliter(s) inhaled every 6 hours

## 2019-11-20 NOTE — PROGRESS NOTE ADULT - PROBLEM SELECTOR PLAN 5
Assist will all ADL's  Supportive measures
Assist will all ADL's  Supportive measures  c/w denepezil
Assist will all ADL's  Supportive measures

## 2019-11-20 NOTE — PROGRESS NOTE ADULT - PROBLEM SELECTOR PLAN 2
C/w Duonebs  Comfort feeds, no tube feeding per MOLST  Aspiration precautions; speech/swallow recommend puree diet w/ nectar thick liquid, however pt can still get cough   d/cd Zosyn(11/17-11/19)   - started augmentin 500mg daily until 11/23
C/w Duonebs  Comfort feeds, no tube feeding per MOLST  Aspiration precautions; speech/swallow recommend puree diet w/ nectar thick liquid, however pt can still get cough   will d/c Zosyn(11/17-) and start augmentin 875mg BID until 11/23
c/w Zosyn  C/w Duonebs  Comfort feeds, no tube feeding per MOLST  Aspiration precautions

## 2019-11-20 NOTE — PROGRESS NOTE ADULT - PROBLEM SELECTOR PLAN 3
Assist with all ADL's  Fall and aspiration precautions
Na normalized but again elevated at 149  D5W for 12hr  -f/u Na in AM
Assist with all ADL's  Fall and aspiration precautions

## 2019-11-20 NOTE — PROGRESS NOTE ADULT - ASSESSMENT
Patient is a 89 year old female, with PMH of dementia AAO x1 baseline, hypothyroidism, HTN, HLD and has HHA 9 hours Mon-Sat, BIBEMS from home for shortness of breath and cough.     Pt is planned for home hospice, wasting for set up.  was involved.  Plan for discharge tomorrow.

## 2019-11-20 NOTE — DISCHARGE NOTE PROVIDER - HOSPITAL COURSE
Patient is a 89 year old female, with PMH of dementia AAO x1 baseline, hypothyroidism, HTN, HLD and has HHA 9 hours Mon-Sat, BIBEMS from home for shortness of breath and cough. As per daughter at bedside, patient has had a chronic cough for some time. This morning, patient seemed to have worsening cough productive of clear sputum. As per daughter, patient also seems to have been coughing whenever she eats food. She normally eats soft foods at home. As per daughter, patient does not seem to have any pain, nausea or vomiting. Unable to get full ROS due to patient's dementia.     Pt is planned for home hospice, wasting for set up.  was involved.        Troponin was elevated and trended          Problem/Plan - 1:    ·  Problem: NSTEMI (non-ST elevated myocardial infarction).  Plan: Tropinin downtrending    Heparin drip discontinued in setting of pt DNR/DNI, hospice per HCP     c/w ASA, Statin, BB    Cardiology Dr. Singh following.          Problem/Plan - 2:    ·  Problem: Pneumonia, aspiration.  Plan: C/w Duonebs    Comfort feeds, no tube feeding per MOLST    Aspiration precautions; speech/swallow recommend puree diet w/ nectar thick liquid, however pt can still get cough     will d/c Zosyn(11/17-) and start augmentin 875mg BID until 11/23.          Problem/Plan - 3:    ·  Problem: Functional quadriplegia.  Plan: Assist with all ADL's    Fall and aspiration precautions.          Problem/Plan - 4:    ·  Problem: Severe protein-calorie malnutrition.  Plan: Albumin 3.4, cachectic    Comfort feeds per HCP    Dysphagia 1 puree with nectar thickened fluid, ensure pudding was added    Aspiration precautions.          Problem/Plan - 5:    ·  Problem: Dementia.  Plan: Assist will all ADL's    Supportive measures.          Problem/Plan - 6:    Problem: Hypothyroidism. Plan: TSH 16.4 , likely due to non-compliance with medication    changed to Syntrhoid 12.5mcg  IV for now.         Problem/Plan - 7:    ·  Problem: Hypokalemia due to inadequate potassium intake.  Plan: repleted.          Problem/Plan - 8:    ·  Problem: Goals of care, counseling/discussion.  Plan: DNR/DNI/DNH , comfort feed.  no tube feeding     MOLST completed    Appreciate Palliative. Patient is a 89 year old female, with PMH of dementia AAO x1 baseline, hypothyroidism, HTN, HLD and has HHA 9 hours Mon-Sat, BIBEMS from home for shortness of breath and cough. As per daughter at bedside, patient has had a chronic cough for some time. This morning, patient seemed to have worsening cough productive of clear sputum. As per daughter, patient also seems to have been coughing whenever she eats food. She normally eats soft foods at home. As per daughter, patient does not seem to have any pain, nausea or vomiting. Unable to get full ROS due to patient's dementia.     Pt is planned for home hospice, wasting for set up.  was involved.        Troponin was elevated, EKG didn't show any ischemic change.    Heparin drip discontinued in setting of pt DNR/DNI, hospice per HCP, c/w ASA, Statin, BB        ·  Problem: For Pneumonia, aspiration, Duonebs q6h and zpsyn was given. Zosyn(11/17-) was D/Cd and started augmentin 500mg BID until 11/23.    Comfort feeds, no tube feeding per MOLST    speech/swallow recommend puree diet w/ nectar thick liquid, however pt can still get cough     For Dementia, managed with supportive measures, denepezil was given.    Pt has hx of Hypothyroidism,  TSH 16.4 , likely due to non-compliance with medication. We continued w/ Synthroid 25mcg,     changed to Syntrhoid 12.5mcg  IV for now.    Pt had hypokalemia due to inadequate potassium intake and it was repleted.     Goals of care was discussed, and pt is DNR/DNI/DNH , comfort feed.  no tube feeding     MOLST completed.    Pt is stable for discharge to home hospice.

## 2019-11-20 NOTE — GOALS OF CARE CONVERSATION - ADVANCED CARE PLANNING - CONVERSATION DETAILS
[facility]:    Home hospice referral received from  Indira Romo. Hospice services reviewed and consents reviewed/signed with daughter Amber.  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 DME (02 for 2LNC PRN and nebulizer with face mask) ordered for delivery tonight.  Daughter made aware.     Per Indira, MLTC aide to be reinstated tomorrow.     Patient approved for home hospice with tentative discharge scheduled for tomorrow. Please confirm receipt of DME Prior to discharge.     Please call referral center at [phone]  to coordinate discharge time.     Thank you for this referral.    Lida Benjamin RN, CHPN, OCN  Inpatient Specialist  [phone]

## 2019-11-20 NOTE — PROGRESS NOTE ADULT - PROBLEM SELECTOR PLAN 4
Albumin 3.4, cachectic  Comfort feeds per HCP  Dysphagia 1 puree with nectar thickened fluid, ensure pudding was added  Aspiration precautions
Albumin 3.4, cachectic  Comfort feeds per HCP  Dysphagia 1 puree with nectar thickened fluid, ensure pudding was added  Aspiration precautions
Albumin 3.4, cachectic  Comfort feeds per HCP  Dysphagia 1 puree with honey thickened fluid  Aspiration precautions

## 2019-11-21 VITALS
DIASTOLIC BLOOD PRESSURE: 56 MMHG | RESPIRATION RATE: 18 BRPM | SYSTOLIC BLOOD PRESSURE: 145 MMHG | TEMPERATURE: 98 F | OXYGEN SATURATION: 100 % | HEART RATE: 85 BPM

## 2019-11-21 LAB
ALBUMIN SERPL ELPH-MCNC: 3 G/DL — LOW (ref 3.5–5)
ALP SERPL-CCNC: 131 U/L — HIGH (ref 40–120)
ALT FLD-CCNC: 56 U/L DA — SIGNIFICANT CHANGE UP (ref 10–60)
ANION GAP SERPL CALC-SCNC: 5 MMOL/L — SIGNIFICANT CHANGE UP (ref 5–17)
ANISOCYTOSIS BLD QL: SLIGHT — SIGNIFICANT CHANGE UP
AST SERPL-CCNC: 46 U/L — HIGH (ref 10–40)
BASOPHILS # BLD AUTO: 0 K/UL — SIGNIFICANT CHANGE UP (ref 0–0.2)
BASOPHILS NFR BLD AUTO: 0 % — SIGNIFICANT CHANGE UP (ref 0–2)
BILIRUB SERPL-MCNC: 0.8 MG/DL — SIGNIFICANT CHANGE UP (ref 0.2–1.2)
BUN SERPL-MCNC: 37 MG/DL — HIGH (ref 7–18)
BURR CELLS BLD QL SMEAR: SLIGHT — SIGNIFICANT CHANGE UP
CALCIUM SERPL-MCNC: 9.3 MG/DL — SIGNIFICANT CHANGE UP (ref 8.4–10.5)
CHLORIDE SERPL-SCNC: 120 MMOL/L — HIGH (ref 96–108)
CO2 SERPL-SCNC: 26 MMOL/L — SIGNIFICANT CHANGE UP (ref 22–31)
CREAT SERPL-MCNC: 1.37 MG/DL — HIGH (ref 0.5–1.3)
DACRYOCYTES BLD QL SMEAR: SLIGHT — SIGNIFICANT CHANGE UP
ELLIPTOCYTES BLD QL SMEAR: SLIGHT — SIGNIFICANT CHANGE UP
EOSINOPHIL # BLD AUTO: 0 K/UL — SIGNIFICANT CHANGE UP (ref 0–0.5)
EOSINOPHIL NFR BLD AUTO: 0 % — SIGNIFICANT CHANGE UP (ref 0–6)
GIANT PLATELETS BLD QL SMEAR: PRESENT — SIGNIFICANT CHANGE UP
GLUCOSE BLDC GLUCOMTR-MCNC: 118 MG/DL — HIGH (ref 70–99)
GLUCOSE BLDC GLUCOMTR-MCNC: 74 MG/DL — SIGNIFICANT CHANGE UP (ref 70–99)
GLUCOSE SERPL-MCNC: 84 MG/DL — SIGNIFICANT CHANGE UP (ref 70–99)
HCT VFR BLD CALC: 39.5 % — SIGNIFICANT CHANGE UP (ref 34.5–45)
HGB BLD-MCNC: 12 G/DL — SIGNIFICANT CHANGE UP (ref 11.5–15.5)
LG PLATELETS BLD QL AUTO: SLIGHT — SIGNIFICANT CHANGE UP
LYMPHOCYTES # BLD AUTO: 0.15 K/UL — LOW (ref 1–3.3)
LYMPHOCYTES # BLD AUTO: 2.1 % — LOW (ref 13–44)
MACROCYTES BLD QL: SLIGHT — SIGNIFICANT CHANGE UP
MAGNESIUM SERPL-MCNC: 2.4 MG/DL — SIGNIFICANT CHANGE UP (ref 1.6–2.6)
MANUAL SMEAR VERIFICATION: SIGNIFICANT CHANGE UP
MCHC RBC-ENTMCNC: 28.2 PG — SIGNIFICANT CHANGE UP (ref 27–34)
MCHC RBC-ENTMCNC: 30.4 GM/DL — LOW (ref 32–36)
MCV RBC AUTO: 92.7 FL — SIGNIFICANT CHANGE UP (ref 80–100)
MICROCYTES BLD QL: SLIGHT — SIGNIFICANT CHANGE UP
MONOCYTES # BLD AUTO: 0.15 K/UL — SIGNIFICANT CHANGE UP (ref 0–0.9)
MONOCYTES NFR BLD AUTO: 2.1 % — SIGNIFICANT CHANGE UP (ref 2–14)
NEUTROPHILS # BLD AUTO: 6.9 K/UL — SIGNIFICANT CHANGE UP (ref 1.8–7.4)
NEUTROPHILS NFR BLD AUTO: 93.7 % — HIGH (ref 43–77)
NRBC # BLD: 0 /100 — SIGNIFICANT CHANGE UP (ref 0–0)
OVALOCYTES BLD QL SMEAR: SLIGHT — SIGNIFICANT CHANGE UP
PHOSPHATE SERPL-MCNC: 3 MG/DL — SIGNIFICANT CHANGE UP (ref 2.5–4.5)
PLAT MORPH BLD: ABNORMAL
PLATELET # BLD AUTO: 182 K/UL — SIGNIFICANT CHANGE UP (ref 150–400)
POIKILOCYTOSIS BLD QL AUTO: SLIGHT — SIGNIFICANT CHANGE UP
POTASSIUM SERPL-MCNC: 3.1 MMOL/L — LOW (ref 3.5–5.3)
POTASSIUM SERPL-SCNC: 3.1 MMOL/L — LOW (ref 3.5–5.3)
PROT SERPL-MCNC: 5.8 G/DL — LOW (ref 6–8.3)
RBC # BLD: 4.26 M/UL — SIGNIFICANT CHANGE UP (ref 3.8–5.2)
RBC # FLD: 15 % — HIGH (ref 10.3–14.5)
RBC BLD AUTO: ABNORMAL
SCHISTOCYTES BLD QL AUTO: SLIGHT — SIGNIFICANT CHANGE UP
SODIUM SERPL-SCNC: 151 MMOL/L — HIGH (ref 135–145)
SPHEROCYTES BLD QL SMEAR: SLIGHT — SIGNIFICANT CHANGE UP
VARIANT LYMPHS # BLD: 2.1 % — SIGNIFICANT CHANGE UP (ref 0–6)
WBC # BLD: 7.36 K/UL — SIGNIFICANT CHANGE UP (ref 3.8–10.5)
WBC # FLD AUTO: 7.36 K/UL — SIGNIFICANT CHANGE UP (ref 3.8–10.5)

## 2019-11-21 PROCEDURE — 81001 URINALYSIS AUTO W/SCOPE: CPT

## 2019-11-21 PROCEDURE — 82746 ASSAY OF FOLIC ACID SERUM: CPT

## 2019-11-21 PROCEDURE — 87631 RESP VIRUS 3-5 TARGETS: CPT

## 2019-11-21 PROCEDURE — 92526 ORAL FUNCTION THERAPY: CPT

## 2019-11-21 PROCEDURE — 93306 TTE W/DOPPLER COMPLETE: CPT

## 2019-11-21 PROCEDURE — 85027 COMPLETE CBC AUTOMATED: CPT

## 2019-11-21 PROCEDURE — 82607 VITAMIN B-12: CPT

## 2019-11-21 PROCEDURE — 85730 THROMBOPLASTIN TIME PARTIAL: CPT

## 2019-11-21 PROCEDURE — 83036 HEMOGLOBIN GLYCOSYLATED A1C: CPT

## 2019-11-21 PROCEDURE — 94640 AIRWAY INHALATION TREATMENT: CPT

## 2019-11-21 PROCEDURE — 87040 BLOOD CULTURE FOR BACTERIA: CPT

## 2019-11-21 PROCEDURE — 71045 X-RAY EXAM CHEST 1 VIEW: CPT

## 2019-11-21 PROCEDURE — 92610 EVALUATE SWALLOWING FUNCTION: CPT

## 2019-11-21 PROCEDURE — 80053 COMPREHEN METABOLIC PANEL: CPT

## 2019-11-21 PROCEDURE — 84484 ASSAY OF TROPONIN QUANT: CPT

## 2019-11-21 PROCEDURE — 85610 PROTHROMBIN TIME: CPT

## 2019-11-21 PROCEDURE — 83735 ASSAY OF MAGNESIUM: CPT

## 2019-11-21 PROCEDURE — 82553 CREATINE MB FRACTION: CPT

## 2019-11-21 PROCEDURE — 93005 ELECTROCARDIOGRAM TRACING: CPT

## 2019-11-21 PROCEDURE — 82962 GLUCOSE BLOOD TEST: CPT

## 2019-11-21 PROCEDURE — 83605 ASSAY OF LACTIC ACID: CPT

## 2019-11-21 PROCEDURE — 99291 CRITICAL CARE FIRST HOUR: CPT | Mod: 25

## 2019-11-21 PROCEDURE — 84443 ASSAY THYROID STIM HORMONE: CPT

## 2019-11-21 PROCEDURE — 84100 ASSAY OF PHOSPHORUS: CPT

## 2019-11-21 PROCEDURE — 80061 LIPID PANEL: CPT

## 2019-11-21 PROCEDURE — 36415 COLL VENOUS BLD VENIPUNCTURE: CPT

## 2019-11-21 PROCEDURE — 82550 ASSAY OF CK (CPK): CPT

## 2019-11-21 PROCEDURE — 87086 URINE CULTURE/COLONY COUNT: CPT

## 2019-11-21 RX ORDER — METOPROLOL TARTRATE 50 MG
0.5 TABLET ORAL
Qty: 30 | Refills: 0
Start: 2019-11-21 | End: 2019-12-20

## 2019-11-21 RX ORDER — POTASSIUM CHLORIDE 20 MEQ
10 PACKET (EA) ORAL
Refills: 0 | Status: DISCONTINUED | OUTPATIENT
Start: 2019-11-21 | End: 2019-11-21

## 2019-11-21 RX ORDER — LANOLIN ALCOHOL/MO/W.PET/CERES
1 CREAM (GRAM) TOPICAL
Qty: 30 | Refills: 0
Start: 2019-11-21 | End: 2019-12-20

## 2019-11-21 RX ORDER — SODIUM CHLORIDE 9 MG/ML
1000 INJECTION, SOLUTION INTRAVENOUS
Refills: 0 | Status: DISCONTINUED | OUTPATIENT
Start: 2019-11-21 | End: 2019-11-21

## 2019-11-21 RX ORDER — AMLODIPINE BESYLATE 2.5 MG/1
1 TABLET ORAL
Qty: 30 | Refills: 0
Start: 2019-11-21 | End: 2019-12-20

## 2019-11-21 RX ORDER — IPRATROPIUM/ALBUTEROL SULFATE 18-103MCG
3 AEROSOL WITH ADAPTER (GRAM) INHALATION
Qty: 90 | Refills: 0
Start: 2019-11-21 | End: 2019-12-20

## 2019-11-21 RX ORDER — AMLODIPINE BESYLATE 2.5 MG/1
1 TABLET ORAL
Qty: 0 | Refills: 0 | DISCHARGE

## 2019-11-21 RX ADMIN — AMLODIPINE BESYLATE 10 MILLIGRAM(S): 2.5 TABLET ORAL at 06:29

## 2019-11-21 RX ADMIN — Medication 3 MILLILITER(S): at 09:46

## 2019-11-21 RX ADMIN — Medication 3 MILLILITER(S): at 03:03

## 2019-11-21 RX ADMIN — Medication 1 TABLET(S): at 06:29

## 2019-11-21 RX ADMIN — Medication 12.5 MILLIGRAM(S): at 06:29

## 2019-11-21 RX ADMIN — Medication 25 MICROGRAM(S): at 06:29

## 2019-11-21 NOTE — DISCHARGE NOTE NURSING/CASE MANAGEMENT/SOCIAL WORK - PATIENT PORTAL LINK FT
You can access the FollowMyHealth Patient Portal offered by [facility] by registering at the following website: [email or web address]. By joining [facility]’s FollowMyHealth portal, you will also be able to view your health information using other applications (apps) compatible with our system.

## 2019-11-23 LAB
CULTURE RESULTS: SIGNIFICANT CHANGE UP
CULTURE RESULTS: SIGNIFICANT CHANGE UP
SPECIMEN SOURCE: SIGNIFICANT CHANGE UP
SPECIMEN SOURCE: SIGNIFICANT CHANGE UP

## 2020-05-22 NOTE — PATIENT PROFILE ADULT - NSPROPASSIVESMOKEEXPOSURE_GEN_A_NUR
Chart reviewed.   Immunizations: LINKS failed   Orders placed: n/a  Upcoming appts to satisfy JOSHUA topics: n/a       No

## 2020-07-06 NOTE — H&P ADULT - NSCORESITESY/N_GEN_A_CORE_RD
Yes Mart-1 - Negative Histology Text: MART-1 staining demonstrates a normal density and pattern of melanocytes along the dermal-epidermal junction. The surgical margins are negative for tumor cells.

## 2024-10-02 NOTE — PATIENT PROFILE ADULT - FAMILY NEEDS
Called pt at listed number no answer LMOM to call us back.3rd attempt,patient has a scheduled ov 10/03/2024.     no